# Patient Record
Sex: MALE | Race: WHITE | NOT HISPANIC OR LATINO | Employment: FULL TIME | URBAN - METROPOLITAN AREA
[De-identification: names, ages, dates, MRNs, and addresses within clinical notes are randomized per-mention and may not be internally consistent; named-entity substitution may affect disease eponyms.]

---

## 2017-01-11 ENCOUNTER — OFFICE VISIT (OUTPATIENT)
Dept: ENDOCRINOLOGY | Age: 47
End: 2017-01-11

## 2017-01-11 VITALS
OXYGEN SATURATION: 97 % | SYSTOLIC BLOOD PRESSURE: 130 MMHG | WEIGHT: 270 LBS | HEIGHT: 68 IN | DIASTOLIC BLOOD PRESSURE: 70 MMHG | HEART RATE: 91 BPM | BODY MASS INDEX: 40.92 KG/M2

## 2017-01-11 DIAGNOSIS — Z99.89 OBSTRUCTIVE SLEEP APNEA ON CPAP: Primary | ICD-10-CM

## 2017-01-11 DIAGNOSIS — E11.9 TYPE 2 DIABETES MELLITUS WITHOUT COMPLICATION, WITHOUT LONG-TERM CURRENT USE OF INSULIN (HCC): ICD-10-CM

## 2017-01-11 DIAGNOSIS — K76.0 NAFLD (NONALCOHOLIC FATTY LIVER DISEASE): ICD-10-CM

## 2017-01-11 DIAGNOSIS — E66.09 OBESITY DUE TO EXCESS CALORIES, UNSPECIFIED OBESITY SEVERITY: ICD-10-CM

## 2017-01-11 DIAGNOSIS — G47.33 OBSTRUCTIVE SLEEP APNEA ON CPAP: Primary | ICD-10-CM

## 2017-01-11 DIAGNOSIS — G47.30 SLEEP APNEA, UNSPECIFIED TYPE: ICD-10-CM

## 2017-01-11 DIAGNOSIS — E78.5 HYPERLIPIDEMIA, UNSPECIFIED HYPERLIPIDEMIA TYPE: ICD-10-CM

## 2017-01-11 PROCEDURE — 99214 OFFICE O/P EST MOD 30 MIN: CPT | Performed by: INTERNAL MEDICINE

## 2017-01-11 NOTE — LETTER
January 11, 2017     Hector Oseguera MD  [address]  [facility] [address]    Patient: Victor Hugo Monet   YOB: 1970   Date of Visit: 1/11/2017       Dear Dr. Ana Rosa MD:    Thank you for referring Victor Hugo Monet to me for evaluation. Below are the relevant portions of my assessment and plan of care.    If you have questions, please do not hesitate to call me. I look forward to following Victor Hugo along with you.         Sincerely,        Jameson Kuo MD        CC: No Recipients  Jameson Kuo MD  1/11/2017  4:28 PM  Signed  Subjective   Victor Hugo Monet is a 46 y.o. male.     HPI Comments: DM2. B/S check 2x a day. [facility] DM eye exam July 2016. Last DM foot exam today with Dr. Kuo.     Diabetes   Hypoglycemia symptoms include headaches.      Patient is a 46-year-old male who came in for follow-up.  He has been on Farxiga 10 mg once a day.  Hemoglobin A1c done in July 2016 was 8.24%.  Fasting blood sugar runs between 150-182.  He had a recent urinalysis which showed glucosuria which is expected on the patient using Farxiga.  He has used metformin the past which cause abdominal cramping and diarrhea.    He has no weight change since July 2016.  He had an eye examination in July 2016 and was prescribed new glasses.  He denies numbness, tingling or burning sensation in his hands or feet.     He has hyperlipidemia and has been on Lipitor 10 mg once a day.  He denies any myalgia.      He had 2 previous episodes of pancreatitis, the first one was in 2014 and the last one was in 2016.  He had a normal MRCP and gallbladder ultrasound.  CT scan of the abdomen showed hepatic steatosis.    He has tried multiple diets in the past but has been unsuccessful.  He is considering bariatric surgery.      He has sleep apnea and is using his CPAP regularly.  He wakes up rested.    The following portions of the patient's history were reviewed and updated as appropriate: allergies, current medications, past family  "history, past medical history, past social history, past surgical history and problem list.    Review of Systems   Constitutional: Negative.    Eyes: Negative.    Respiratory: Negative.    Cardiovascular: Negative.    Gastrointestinal: Negative.    Endocrine: Negative.    Genitourinary: Negative.    Musculoskeletal: Negative.    Skin: Negative.    Allergic/Immunologic: Negative.    Neurological: Positive for headaches. Light-headedness:  migraines last two months.   Hematological: Negative.    Psychiatric/Behavioral: Positive for sleep disturbance (sleep apnea uses cpap).       Objective      Vitals:    01/11/17 1504   BP: 130/70   BP Location: Right arm   Patient Position: Sitting   Cuff Size: Large Adult   Pulse: 91   SpO2: 97%   Weight: 270 lb (122 kg)   Height: 68\" (172.7 cm)     Physical Exam   Constitutional: He is oriented to person, place, and time. He appears well-developed and well-nourished. No distress.   HENT:   Head: Normocephalic.   Nose: Nose normal.   Mouth/Throat: No oropharyngeal exudate.   Eyes: Conjunctivae and EOM are normal. Right eye exhibits no discharge. Left eye exhibits no discharge. No scleral icterus.   Neck: Neck supple. No JVD present. No tracheal deviation present. No thyromegaly present.   Cardiovascular: Normal rate, regular rhythm, normal heart sounds and intact distal pulses.  Exam reveals no friction rub.    No murmur heard.  Pulmonary/Chest: Effort normal and breath sounds normal. No respiratory distress. He has no wheezes. He has no rales.   Abdominal: Soft. Bowel sounds are normal. He exhibits no distension and no mass. There is no tenderness.   Musculoskeletal: Normal range of motion. He exhibits no edema, tenderness or deformity.   Lymphadenopathy:     He has no cervical adenopathy.   Neurological: He is alert and oriented to person, place, and time. He displays normal reflexes. No cranial nerve deficit. Coordination normal.   Intact light touch   Skin: Skin is warm and " dry. No rash noted. No erythema. No pallor.   Psychiatric: He has a normal mood and affect. His behavior is normal.     Office Visit on 07/07/2016   Component Date Value Ref Range Status   • Glucose 07/07/2016 137* 65 - 99 mg/dL Final   • BUN 07/07/2016 17  6 - 20 mg/dL Final   • Creatinine 07/07/2016 1.08  0.76 - 1.27 mg/dL Final   • eGFR Non  Am 07/07/2016 73  >60 mL/min/1.73 Final   • eGFR African Am 07/07/2016 89  >60 mL/min/1.73 Final   • BUN/Creatinine Ratio 07/07/2016 15.7  7.0 - 25.0 Final   • Sodium 07/07/2016 141  136 - 145 mmol/L Final   • Potassium 07/07/2016 4.7  3.5 - 5.2 mmol/L Final   • Chloride 07/07/2016 98  98 - 107 mmol/L Final   • Total CO2 07/07/2016 27.3  22.0 - 29.0 mmol/L Final   • Calcium 07/07/2016 10.6* 8.6 - 10.5 mg/dL Final   • Total Protein 07/07/2016 7.9  6.0 - 8.5 g/dL Final   • Albumin 07/07/2016 5.10  3.50 - 5.20 g/dL Final   • Globulin 07/07/2016 2.8  gm/dL Final   • A/G Ratio 07/07/2016 1.8  g/dL Final   • Total Bilirubin 07/07/2016 0.5  0.1 - 1.2 mg/dL Final   • Alkaline Phosphatase 07/07/2016 63  39 - 117 U/L Final   • AST (SGOT) 07/07/2016 39  1 - 40 U/L Final   • ALT (SGPT) 07/07/2016 60* 1 - 41 U/L Final   • Total Cholesterol 07/07/2016 190  0 - 200 mg/dL Final   • Triglycerides 07/07/2016 170* 0 - 150 mg/dL Final   • HDL Cholesterol 07/07/2016 52  40 - 60 mg/dL Final   • VLDL Cholesterol 07/07/2016 34  5 - 40 mg/dL Final   • LDL Cholesterol  07/07/2016 104* 0 - 100 mg/dL Final   • Hemoglobin A1C 07/07/2016 8.24* 4.80 - 5.60 % Final    Comment: Hemoglobin A1C Ranges:  Increased Risk for Diabetes  5.7% to 6.4%  Diabetes                     >= 6.5%  Diabetic Goal                < 7.0%     • TSH 07/07/2016 1.310  0.270 - 4.200 mIU/mL Final   • Free T4 07/07/2016 1.22  0.93 - 1.70 ng/dL Final   • Creatinine, Urine 07/07/2016 68.7  Not Estab. mg/dL Final   • Microalbumin, Urine 07/07/2016 32.0  Not Estab. ug/mL Final   • Microalbumin/Creatinine Ratio Urine 07/07/2016 46.6*  0.0 - 30.0 mg/g creat Final     Assessment/Plan   Victor Hugo was seen today for diabetes.    Diagnoses and all orders for this visit:    Obstructive sleep apnea on CPAP    Obesity due to excess calories, unspecified obesity severity    NAFLD (nonalcoholic fatty liver disease)    Type 2 diabetes mellitus without complication, without long-term current use of insulin  -     Comprehensive Metabolic Panel  -     Hemoglobin A1c  -     Lipid Panel  -     Microalbumin / Creatinine Urine Ratio  -     Ambulatory Referral to Diabetic Education  -     linagliptin (TRADJENTA) 5 MG tablet tablet; Take 1 tablet by mouth Daily.    Sleep apnea, unspecified type    Hyperlipidemia, unspecified hyperlipidemia type  -     Lipid Panel      Discontinue Farxiga which can cause glucosuria and is not FAA approved    Tradjenta 5 mg once a day.  Continue Lipitor 10 mg once a day.  Continue CPAP.  Discussed about bariatric surgery.  Patient info on bariatric surgery given to patient.    Send copy of my note and labs to Dr. Oseguera and Dr. Vic Garcia at [facility] [employer], [facility], [address], [place] [address]    RTC 3- 4 mos

## 2017-01-11 NOTE — PROGRESS NOTES
Subjective   Victor Hugo Monet is a 46 y.o. male.     HPI Comments: DM2. B/S check 2x a day. [facility] DM eye exam July 2016. Last DM foot exam today with Dr. Kuo.     Diabetes   Hypoglycemia symptoms include headaches.      Patient is a 46-year-old male who came in for follow-up.  He has been on Farxiga 10 mg once a day.  Hemoglobin A1c done in July 2016 was 8.24%.  Fasting blood sugar runs between 150-182.  He had a recent urinalysis which showed glucosuria which is expected on the patient using Farxiga.  He has used metformin the past which cause abdominal cramping and diarrhea.    He has no weight change since July 2016.  He had an eye examination in July 2016 and was prescribed new glasses.  He denies numbness, tingling or burning sensation in his hands or feet.     He has hyperlipidemia and has been on Lipitor 10 mg once a day.  He denies any myalgia.      He had 2 previous episodes of pancreatitis, the first one was in 2014 and the last one was in 2016.  He had a normal MRCP and gallbladder ultrasound.  CT scan of the abdomen showed hepatic steatosis.    He has tried multiple diets in the past but has been unsuccessful.  He is considering bariatric surgery.      He has sleep apnea and is using his CPAP regularly.  He wakes up rested.    The following portions of the patient's history were reviewed and updated as appropriate: allergies, current medications, past family history, past medical history, past social history, past surgical history and problem list.    Review of Systems   Constitutional: Negative.    Eyes: Negative.    Respiratory: Negative.    Cardiovascular: Negative.    Gastrointestinal: Negative.    Endocrine: Negative.    Genitourinary: Negative.    Musculoskeletal: Negative.    Skin: Negative.    Allergic/Immunologic: Negative.    Neurological: Positive for headaches. Light-headedness:  migraines last two months.   Hematological: Negative.    Psychiatric/Behavioral: Positive for sleep disturbance  "(sleep apnea uses cpap).       Objective      Vitals:    01/11/17 1504   BP: 130/70   BP Location: Right arm   Patient Position: Sitting   Cuff Size: Large Adult   Pulse: 91   SpO2: 97%   Weight: 270 lb (122 kg)   Height: 68\" (172.7 cm)     Physical Exam   Constitutional: He is oriented to person, place, and time. He appears well-developed and well-nourished. No distress.   HENT:   Head: Normocephalic.   Nose: Nose normal.   Mouth/Throat: No oropharyngeal exudate.   Eyes: Conjunctivae and EOM are normal. Right eye exhibits no discharge. Left eye exhibits no discharge. No scleral icterus.   Neck: Neck supple. No JVD present. No tracheal deviation present. No thyromegaly present.   Cardiovascular: Normal rate, regular rhythm, normal heart sounds and intact distal pulses.  Exam reveals no friction rub.    No murmur heard.  Pulmonary/Chest: Effort normal and breath sounds normal. No respiratory distress. He has no wheezes. He has no rales.   Abdominal: Soft. Bowel sounds are normal. He exhibits no distension and no mass. There is no tenderness.   Musculoskeletal: Normal range of motion. He exhibits no edema, tenderness or deformity.   Lymphadenopathy:     He has no cervical adenopathy.   Neurological: He is alert and oriented to person, place, and time. He displays normal reflexes. No cranial nerve deficit. Coordination normal.   Intact light touch   Skin: Skin is warm and dry. No rash noted. No erythema. No pallor.   Psychiatric: He has a normal mood and affect. His behavior is normal.     Office Visit on 07/07/2016   Component Date Value Ref Range Status   • Glucose 07/07/2016 137* 65 - 99 mg/dL Final   • BUN 07/07/2016 17  6 - 20 mg/dL Final   • Creatinine 07/07/2016 1.08  0.76 - 1.27 mg/dL Final   • eGFR Non  Am 07/07/2016 73  >60 mL/min/1.73 Final   • eGFR African Am 07/07/2016 89  >60 mL/min/1.73 Final   • BUN/Creatinine Ratio 07/07/2016 15.7  7.0 - 25.0 Final   • Sodium 07/07/2016 141  136 - 145 mmol/L " Final   • Potassium 07/07/2016 4.7  3.5 - 5.2 mmol/L Final   • Chloride 07/07/2016 98  98 - 107 mmol/L Final   • Total CO2 07/07/2016 27.3  22.0 - 29.0 mmol/L Final   • Calcium 07/07/2016 10.6* 8.6 - 10.5 mg/dL Final   • Total Protein 07/07/2016 7.9  6.0 - 8.5 g/dL Final   • Albumin 07/07/2016 5.10  3.50 - 5.20 g/dL Final   • Globulin 07/07/2016 2.8  gm/dL Final   • A/G Ratio 07/07/2016 1.8  g/dL Final   • Total Bilirubin 07/07/2016 0.5  0.1 - 1.2 mg/dL Final   • Alkaline Phosphatase 07/07/2016 63  39 - 117 U/L Final   • AST (SGOT) 07/07/2016 39  1 - 40 U/L Final   • ALT (SGPT) 07/07/2016 60* 1 - 41 U/L Final   • Total Cholesterol 07/07/2016 190  0 - 200 mg/dL Final   • Triglycerides 07/07/2016 170* 0 - 150 mg/dL Final   • HDL Cholesterol 07/07/2016 52  40 - 60 mg/dL Final   • VLDL Cholesterol 07/07/2016 34  5 - 40 mg/dL Final   • LDL Cholesterol  07/07/2016 104* 0 - 100 mg/dL Final   • Hemoglobin A1C 07/07/2016 8.24* 4.80 - 5.60 % Final    Comment: Hemoglobin A1C Ranges:  Increased Risk for Diabetes  5.7% to 6.4%  Diabetes                     >= 6.5%  Diabetic Goal                < 7.0%     • TSH 07/07/2016 1.310  0.270 - 4.200 mIU/mL Final   • Free T4 07/07/2016 1.22  0.93 - 1.70 ng/dL Final   • Creatinine, Urine 07/07/2016 68.7  Not Estab. mg/dL Final   • Microalbumin, Urine 07/07/2016 32.0  Not Estab. ug/mL Final   • Microalbumin/Creatinine Ratio Urine 07/07/2016 46.6* 0.0 - 30.0 mg/g creat Final     Assessment/Plan   Victor Hugo was seen today for diabetes.    Diagnoses and all orders for this visit:    Obstructive sleep apnea on CPAP    Obesity due to excess calories, unspecified obesity severity    NAFLD (nonalcoholic fatty liver disease)    Type 2 diabetes mellitus without complication, without long-term current use of insulin  -     Comprehensive Metabolic Panel  -     Hemoglobin A1c  -     Lipid Panel  -     Microalbumin / Creatinine Urine Ratio  -     Ambulatory Referral to Diabetic Education  -      linagliptin (TRADJENTA) 5 MG tablet tablet; Take 1 tablet by mouth Daily.    Sleep apnea, unspecified type    Hyperlipidemia, unspecified hyperlipidemia type  -     Lipid Panel      Discontinue Farxiga which can cause glucosuria and is not FAA approved    Tradjenta 5 mg once a day.  Continue Lipitor 10 mg once a day.  Continue CPAP.  Discussed about bariatric surgery.  Patient info on bariatric surgery given to patient.    Send copy of my note and labs to Dr. Oseguera and Dr. Vic Garcia at [facility] [employer], [facility], [address], [place] [address]    RTC 3- 4 mos

## 2017-01-11 NOTE — MR AVS SNAPSHOT
Victor Hugo Monet   1/11/2017 3:00 PM   Office Visit    Dept Phone:  [phone]   Encounter #:  [id number]    Provider:  Jameson Kuo MD   Department:  [facility] ENDOCRINOLOGY                Your Full Care Plan              Today's Medication Changes          These changes are accurate as of: 1/11/17  4:36 PM.  If you have any questions, ask your nurse or doctor.               New Medication(s)Ordered:     linagliptin 5 MG tablet tablet   Commonly known as:  TRADJENTA   Take 1 tablet by mouth Daily.   Started by:  Jameson Kuo MD         Stop taking medication(s)listed here:     Dapagliflozin Propanediol 10 MG tablet   Stopped by:  Jameson Kuo MD                Where to Get Your Medications      These medications were sent to [facility] [address] [place] [address] AT [address] - [phone]  - [phone] [address] [address]     Phone:  [phone]     linagliptin 5 MG tablet tablet                  Your Updated Medication List          This list is accurate as of: 1/11/17  4:36 PM.  Always use your most recent med list.                ALIGN PO       atorvastatin 10 MG tablet   Commonly known as:  LIPITOR   Take 1 tablet by mouth Daily.       esomeprazole 40 MG capsule   Commonly known as:  nexIUM   Take 1 capsule by mouth 2 (Two) Times a Day.       glucose blood test strip   Freestyle light glucose strips, test BID       linagliptin 5 MG tablet tablet   Commonly known as:  TRADJENTA   Take 1 tablet by mouth Daily.               We Performed the Following     Ambulatory Referral to Diabetic Education     Comprehensive Metabolic Panel     Hemoglobin A1c     Lipid Panel     Microalbumin / Creatinine Urine Ratio     Urinalysis With / Microscopic If Indicated       You Were Diagnosed With        Codes Comments    Obstructive sleep apnea on CPAP    -  Primary ICD-10-CM: G47.33  ICD-9-CM:  327.23     Obesity due to excess calories, unspecified obesity severity     ICD-10-CM: E66.09  ICD-9-CM: 278.00     NAFLD (nonalcoholic fatty liver disease)     ICD-10-CM: K76.0  ICD-9-CM: 571.8     Type 2 diabetes mellitus without complication, without long-term current use of insulin     ICD-10-CM: E11.9  ICD-9-CM: 250.00     Sleep apnea, unspecified type     ICD-10-CM: G47.30  ICD-9-CM: 780.57     Hyperlipidemia, unspecified hyperlipidemia type     ICD-10-CM: E78.5  ICD-9-CM: 272.4       Instructions     None    Patient Instructions History      Upcoming Appointments     Visit Type Date Time Department    OFFICE VISIT 2017  3:00 PM [facility]    OFFICE VISIT 3/30/2017 11:30 AM [facility]    OFFICE VISIT 2017  1:20 PM [facility]      [employer] Signup     [facility] [employer] allows you to send messages to your doctor, view your test results, renew your prescriptions, schedule appointments, and more. To sign up, go to [email or web address] and click on the Sign Up Now link in the New User? box. Enter your [employer] Activation Code exactly as it appears below along with the last four digits of your Social Security Number and your Date of Birth () to complete the sign-up process. If you do not sign up before the expiration date, you must request a new code.    [employer] Activation Code: [email or web address]  Expires: 2017  5:40 AM    If you have questions, you can email [email or web address] or call [phone] to talk to our [employer] staff. Remember, [employer] is NOT to be used for urgent needs. For medical emergencies, dial 911.               Other Info from Your Visit           Your Appointments     Mar 30, 2017 11:30 AM EDT   Office Visit with Jameson Kuo MD   [facility] (--)    [address] [facility] [place] [address] [phone]           Arrive 15 minutes prior to appointment.            2017  1:20 PM EDT  "  Office Visit with Hector Oseguera MD   [facility] (--)    [address] [facility] [address] [phone]           Arrive 15 minutes prior to appointment.              Allergies     No Known Allergies      Reason for Visit     Diabetes           Vital Signs     Blood Pressure Pulse Height Weight Oxygen Saturation Body Mass Index    130/70 (BP Location: Right arm, Patient Position: Sitting, Cuff Size: Large Adult) 91 68\" (172.7 cm) 270 lb (122 kg) 97% 41.05 kg/m2    Smoking Status                   Former Smoker           Problems and Diagnoses Noted     High cholesterol or triglycerides    NAFLD (nonalcoholic fatty liver disease)    Obesity due to excess calories    Sleep apnea    Sleep apnea    Type 2 diabetes mellitus without complication        "

## 2017-01-12 LAB
ALBUMIN SERPL-MCNC: 5.2 G/DL (ref 3.5–5.2)
ALBUMIN/CREAT UR: 38 MG/G CREAT (ref 0–30)
ALBUMIN/GLOB SERPL: 1.6 G/DL
ALP SERPL-CCNC: 60 U/L (ref 39–117)
ALT SERPL-CCNC: 52 U/L (ref 1–41)
APPEARANCE UR: CLEAR
AST SERPL-CCNC: 37 U/L (ref 1–40)
BILIRUB SERPL-MCNC: 0.5 MG/DL (ref 0.1–1.2)
BILIRUB UR QL STRIP: ABNORMAL
BUN SERPL-MCNC: 20 MG/DL (ref 6–20)
BUN/CREAT SERPL: 18.2 (ref 7–25)
CALCIUM SERPL-MCNC: 10.5 MG/DL (ref 8.6–10.5)
CHLORIDE SERPL-SCNC: 97 MMOL/L (ref 98–107)
CHOLEST SERPL-MCNC: 156 MG/DL (ref 0–200)
CO2 SERPL-SCNC: 24.9 MMOL/L (ref 22–29)
COLOR UR: YELLOW
CREAT SERPL-MCNC: 1.1 MG/DL (ref 0.76–1.27)
CREAT UR-MCNC: 89.2 MG/DL
GLOBULIN SER CALC-MCNC: 3.2 GM/DL
GLUCOSE SERPL-MCNC: 174 MG/DL (ref 65–99)
GLUCOSE UR QL: ABNORMAL
HBA1C MFR BLD: 8.4 % (ref 4.8–5.6)
HDLC SERPL-MCNC: 51 MG/DL (ref 40–60)
HGB UR QL STRIP: ABNORMAL
KETONES UR QL STRIP: ABNORMAL
LDLC SERPL CALC-MCNC: 72 MG/DL (ref 0–100)
LEUKOCYTE ESTERASE UR QL STRIP: ABNORMAL
MICROALBUMIN UR-MCNC: 33.9 UG/ML
NITRITE UR QL STRIP: ABNORMAL
PH UR STRIP: 6 [PH] (ref 5–8)
POTASSIUM SERPL-SCNC: 4.6 MMOL/L (ref 3.5–5.2)
PROT SERPL-MCNC: 8.4 G/DL (ref 6–8.5)
PROT UR QL STRIP: ABNORMAL
SODIUM SERPL-SCNC: 141 MMOL/L (ref 136–145)
SP GR UR: ABNORMAL (ref 1–1.03)
TRIGL SERPL-MCNC: 163 MG/DL (ref 0–150)
UROBILINOGEN UR STRIP-MCNC: ABNORMAL MG/DL
VLDLC SERPL CALC-MCNC: 32.6 MG/DL (ref 5–40)

## 2017-01-13 ENCOUNTER — TELEPHONE (OUTPATIENT)
Dept: ENDOCRINOLOGY | Age: 47
End: 2017-01-13

## 2017-01-13 RX ORDER — GLIMEPIRIDE 2 MG/1
2 TABLET ORAL EVERY MORNING
Qty: 30 TABLET | Refills: 5 | Status: SHIPPED | OUTPATIENT
Start: 2017-01-13 | End: 2017-06-02

## 2017-01-13 NOTE — TELEPHONE ENCOUNTER
----- Message from Georgia Live sent at 1/13/2017  2:08 PM EST -----  Contact: patient  Patient is returning your call and would like another call back regarding lad results

## 2017-01-18 ENCOUNTER — OFFICE VISIT (OUTPATIENT)
Dept: FAMILY MEDICINE CLINIC | Facility: CLINIC | Age: 47
End: 2017-01-18

## 2017-01-18 VITALS
HEART RATE: 70 BPM | TEMPERATURE: 97.7 F | WEIGHT: 270 LBS | BODY MASS INDEX: 40.92 KG/M2 | OXYGEN SATURATION: 98 % | DIASTOLIC BLOOD PRESSURE: 82 MMHG | HEIGHT: 68 IN | SYSTOLIC BLOOD PRESSURE: 132 MMHG

## 2017-01-18 DIAGNOSIS — G47.30 SLEEP APNEA, UNSPECIFIED TYPE: ICD-10-CM

## 2017-01-18 DIAGNOSIS — E66.09 OBESITY DUE TO EXCESS CALORIES, UNSPECIFIED OBESITY SEVERITY: ICD-10-CM

## 2017-01-18 DIAGNOSIS — E11.9 TYPE 2 DIABETES MELLITUS WITHOUT COMPLICATION, WITHOUT LONG-TERM CURRENT USE OF INSULIN (HCC): Primary | ICD-10-CM

## 2017-01-18 PROCEDURE — 99213 OFFICE O/P EST LOW 20 MIN: CPT | Performed by: INTERNAL MEDICINE

## 2017-01-18 NOTE — PROGRESS NOTES
Subjective  plain is follow-up for diabetes and paperwork for disability  Victor Hugo Monet is a 46 y.o. male.     History of Present Illness   Victor Hugo is here today for follow-up on his non-insulin-dependent diabetes mellitus.  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  He had an increased amount of sugar in his urine.  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  He reported that he was prediabetic.  His sugar and his urine was likely elevated because of Farxiga apparently is not an FAA approved medication.  He has since seen his endocrinologist.  He has been started on glimepiride.  He previously has had trouble with pancreatitis while on the DP P4 inhibitors.  He has not tolerated metformin well.  He does have obesity by body mass index and does have obstructive sleep apnea.  He is contemplating weight loss surgery to help combat his diabetes.  The following portions of the patient's history were reviewed and updated as appropriate: allergies, current medications, past medical history and problem list.    Review of Systems   Gastrointestinal: Negative for abdominal pain, diarrhea, nausea and vomiting.       Objective   Physical Exam   Cardiovascular: Normal rate, regular rhythm and normal heart sounds.    Pulmonary/Chest: Effort normal and breath sounds normal.   Abdominal: Soft. Bowel sounds are normal. He exhibits no distension. There is no tenderness. There is no rebound and no guarding.   Musculoskeletal: He exhibits no edema.   Nursing note and vitals reviewed.      Assessment/Plan   Victor Hugo was seen today for follow-up, diabetes and hyperlipidemia.    Diagnoses and all orders for this visit:    Type 2 diabetes mellitus without complication, without long-term current use of insulin    Obesity due to excess calories, unspecified obesity severity    Sleep apnea, unspecified type    Other orders  -     glucose blood test strip; Freestyle light glucose strips, test BID    Victor Hugo  here today for follow-up.  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  He is going to look into bariatric surgery and I will write a letter

## 2017-01-23 ENCOUNTER — TELEPHONE (OUTPATIENT)
Dept: FAMILY MEDICINE CLINIC | Facility: CLINIC | Age: 47
End: 2017-01-23

## 2017-01-23 NOTE — TELEPHONE ENCOUNTER
----- Message from Theresa Escobar sent at 1/23/2017  2:24 PM EST -----  OSVALDO NEEDS A TREATMENT PLAN AND WORK STATUS FOR HIS [facility] PAPERWORK  Eyes the patient that under treatment plan I filled out that he was already on glimepiride and Ronni's may need to be added.  But that is about as good as I can do because Dr. Kuo

## 2017-01-23 NOTE — TELEPHONE ENCOUNTER
msg left infoming pt fmla forms are ready to be picked up.    Forms were also faxed to # listed on forms

## 2017-01-26 LAB — HM DIABETES EYE EXAM: NORMAL

## 2017-02-07 ENCOUNTER — APPOINTMENT (OUTPATIENT)
Dept: DIABETES SERVICES | Facility: HOSPITAL | Age: 47
End: 2017-02-07
Attending: INTERNAL MEDICINE

## 2017-02-07 PROCEDURE — G0109 DIAB MANAGE TRN IND/GROUP: HCPCS

## 2017-02-14 ENCOUNTER — APPOINTMENT (OUTPATIENT)
Dept: DIABETES SERVICES | Facility: HOSPITAL | Age: 47
End: 2017-02-14
Attending: INTERNAL MEDICINE

## 2017-02-14 PROCEDURE — G0109 DIAB MANAGE TRN IND/GROUP: HCPCS

## 2017-02-16 ENCOUNTER — OFFICE (OUTPATIENT)
Dept: URBAN - METROPOLITAN AREA OTHER 6 | Facility: OTHER | Age: 47
End: 2017-02-16

## 2017-02-16 VITALS
HEART RATE: 80 BPM | HEIGHT: 68 IN | WEIGHT: 260 LBS | DIASTOLIC BLOOD PRESSURE: 79 MMHG | SYSTOLIC BLOOD PRESSURE: 129 MMHG

## 2017-02-16 DIAGNOSIS — K21.0 GASTRO-ESOPHAGEAL REFLUX DISEASE WITH ESOPHAGITIS: ICD-10-CM

## 2017-02-16 DIAGNOSIS — Z12.11 ENCOUNTER FOR SCREENING FOR MALIGNANT NEOPLASM OF COLON: ICD-10-CM

## 2017-02-16 PROCEDURE — 99213 OFFICE O/P EST LOW 20 MIN: CPT

## 2017-02-17 RX ORDER — BLOOD-GLUCOSE METER
KIT MISCELLANEOUS
Qty: 100 EACH | Refills: 1 | Status: SHIPPED | OUTPATIENT
Start: 2017-02-17 | End: 2017-06-02 | Stop reason: SDUPTHER

## 2017-02-21 ENCOUNTER — APPOINTMENT (OUTPATIENT)
Dept: DIABETES SERVICES | Facility: HOSPITAL | Age: 47
End: 2017-02-21
Attending: INTERNAL MEDICINE

## 2017-02-21 PROCEDURE — G0109 DIAB MANAGE TRN IND/GROUP: HCPCS

## 2017-02-28 ENCOUNTER — APPOINTMENT (OUTPATIENT)
Dept: DIABETES SERVICES | Facility: HOSPITAL | Age: 47
End: 2017-02-28
Attending: INTERNAL MEDICINE

## 2017-02-28 PROCEDURE — G0109 DIAB MANAGE TRN IND/GROUP: HCPCS

## 2017-06-02 ENCOUNTER — OFFICE VISIT (OUTPATIENT)
Dept: FAMILY MEDICINE CLINIC | Facility: CLINIC | Age: 47
End: 2017-06-02

## 2017-06-02 VITALS
HEART RATE: 59 BPM | TEMPERATURE: 97.9 F | HEIGHT: 68 IN | WEIGHT: 259 LBS | BODY MASS INDEX: 39.25 KG/M2 | SYSTOLIC BLOOD PRESSURE: 120 MMHG | RESPIRATION RATE: 16 BRPM | DIASTOLIC BLOOD PRESSURE: 80 MMHG

## 2017-06-02 DIAGNOSIS — E78.5 HYPERLIPIDEMIA, UNSPECIFIED HYPERLIPIDEMIA TYPE: ICD-10-CM

## 2017-06-02 DIAGNOSIS — K11.5 STONE OF SALIVARY DUCT: ICD-10-CM

## 2017-06-02 DIAGNOSIS — E11.9 TYPE 2 DIABETES MELLITUS WITHOUT COMPLICATION, WITHOUT LONG-TERM CURRENT USE OF INSULIN (HCC): Primary | ICD-10-CM

## 2017-06-02 PROCEDURE — 99213 OFFICE O/P EST LOW 20 MIN: CPT | Performed by: INTERNAL MEDICINE

## 2017-06-02 RX ORDER — ESOMEPRAZOLE MAGNESIUM 40 MG/1
40 CAPSULE, DELAYED RELEASE ORAL DAILY
Qty: 90 CAPSULE | Refills: 1 | Status: SHIPPED | OUTPATIENT
Start: 2017-06-02 | End: 2017-12-08 | Stop reason: SDUPTHER

## 2017-06-02 RX ORDER — ATORVASTATIN CALCIUM 10 MG/1
10 TABLET, FILM COATED ORAL DAILY
Qty: 90 TABLET | Refills: 1 | Status: SHIPPED | OUTPATIENT
Start: 2017-06-02 | End: 2017-12-08 | Stop reason: SDUPTHER

## 2017-06-02 NOTE — PROGRESS NOTES
Subjective if complaint is follow-up for diabetes and cholesterol  Victor Hugo Monet is a 47 y.o. male.     History of Present Illness   Albert is here today for follow-up.  His A1c was tested by his endocrinologist in March.  It was 6.3.  His cholesterol was checked in January and he was within guidelines on all of his numbers.  He now is on metformin extended release and Januvia.  This seems to control his blood sugar without causing pancreatitis or abdominal symptoms.  He continues to take Nexium for reflux but is only taking it once a day.  Complains of a hard knot beneath his tongue.  He is unsure of how long it is been there.  It is not necessarily painful.  The following portions of the patient's history were reviewed and updated as appropriate: allergies, current medications, past medical history and problem list.    Review of Systems   Respiratory: Negative for chest tightness and shortness of breath.    Cardiovascular: Negative for chest pain and leg swelling.        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   Neurological: Negative for dizziness, light-headedness and headaches.       Objective   Physical Exam   Constitutional: He appears well-developed and well-nourished.   HENT:   There is a palpable Stone like density in the left submandibular duct.   Neck: Carotid bruit is not present. No thyromegaly present.   Cardiovascular: Normal rate, regular rhythm, normal heart sounds and intact distal pulses.  Exam reveals no gallop and no friction rub.    No murmur heard.  Pulmonary/Chest: Effort normal and breath sounds normal. No respiratory distress. He has no wheezes. He has no rales.   Abdominal: Soft. Bowel sounds are normal. He exhibits no distension. There is no tenderness. There is no rebound and no guarding.   Musculoskeletal: He exhibits no edema.   Nursing note and vitals reviewed.      Assessment/Plan   Victor Hugo was seen today for  follow-up.    Diagnoses and all orders for this visit:    Type 2 diabetes mellitus without complication, without long-term current use of insulin    Hyperlipidemia, unspecified hyperlipidemia type    Stone of salivary duct  -     Ambulatory Referral to ENT (Otolaryngology)    Other orders  -     esomeprazole (nexIUM) 40 MG capsule; Take 1 capsule by mouth Daily.  -     atorvastatin (LIPITOR) 10 MG tablet; Take 1 tablet by mouth Daily.  -     glucose blood (FREESTYLE LITE) test strip; 1 each by Other route Daily. Use as instructed      Victor Hugo is here today for follow-up.  He seems to be doing well in terms of his diabetes and has lost some weight.  I am going to see him back in 6 months.  He does have a salivary stone in the left submandibular duct.  I am going to refer him to an ear nose and throat doctor.

## 2017-08-21 ENCOUNTER — TELEPHONE (OUTPATIENT)
Dept: FAMILY MEDICINE CLINIC | Facility: CLINIC | Age: 47
End: 2017-08-21

## 2017-08-21 DIAGNOSIS — K64.9 HEMORRHOIDS, UNSPECIFIED HEMORRHOID TYPE: Primary | ICD-10-CM

## 2017-08-21 NOTE — TELEPHONE ENCOUNTER
----- Message from Rhonda Rosario sent at 8/21/2017  1:50 PM EDT -----  Patient would like to speak with you about getting a referral for hemorrhoids. He wasn't sure who he should contact. He can be reached at [phone].    Thanks,  Ina   A message was left that we would get him an appointment with Dr. Drummond.

## 2017-10-27 ENCOUNTER — OFFICE VISIT (OUTPATIENT)
Dept: SURGERY | Facility: CLINIC | Age: 47
End: 2017-10-27

## 2017-10-27 VITALS
SYSTOLIC BLOOD PRESSURE: 102 MMHG | HEART RATE: 57 BPM | BODY MASS INDEX: 39.56 KG/M2 | HEIGHT: 68 IN | WEIGHT: 261 LBS | OXYGEN SATURATION: 98 % | DIASTOLIC BLOOD PRESSURE: 74 MMHG | TEMPERATURE: 98 F

## 2017-10-27 DIAGNOSIS — K62.5 RECTAL BLEEDING: Primary | ICD-10-CM

## 2017-10-27 DIAGNOSIS — K64.8 INTERNAL HEMORRHOIDS WITH COMPLICATION: ICD-10-CM

## 2017-10-27 PROCEDURE — 46600 DIAGNOSTIC ANOSCOPY SPX: CPT | Performed by: COLON & RECTAL SURGERY

## 2017-10-27 PROCEDURE — 99244 OFF/OP CNSLTJ NEW/EST MOD 40: CPT | Performed by: COLON & RECTAL SURGERY

## 2017-10-27 NOTE — PROGRESS NOTES
Victor Hugo Monet is a 47 y.o. male who is seen as a consult at the request of Hector Oseguera,* for anal pain    HPI:    Pt c/o problems with painful hemorrhoids  Sits a lot for his job as a  for [employer]  Worse 2 months ago; better now    Has also noted some bleeding    Has tried PrepH cream, Tucks wipes, and PrepH suppositories    No fiber supplements or stool softeners    BMs regular, daily    Does sometimes have to strain    Diagnosed with BM last year; has changed his diet    On metformin, Januvia  Gave him diarrhea at first.  Now on XR, which helped    Hx pancreatitis 2014  GI Dr. Anand    Most recent cy 12/23/2009 Dr. Dubose for n/v, diarrhea, and abd pain.  Random bx neg    Past Medical History:   Diagnosis Date   • CPAP (continuous positive airway pressure) dependence    • GERD (gastroesophageal reflux disease)    • Hyperlipidemia    • Obesity    • Pancreatitis 2014,2016    Dr. Anand   • Sleep apnea     ON CPAP.  DR. PATEL   • Stone of salivary duct    • Type 2 diabetes mellitus        Past Surgical History:   Procedure Laterality Date   • ADENOIDECTOMY     • APPENDECTOMY     • COLONOSCOPY  2006    Normal   • ENDOSCOPY  2014    Gastritis   • ERCP     • HERNIA REPAIR      Umbilical and right inguinal       Social History:   reports that he quit smoking about 13 years ago. His smoking use included Cigarettes. He has a 10.00 pack-year smoking history. He has never used smokeless tobacco. He reports that he drinks alcohol. He reports that he does not use illicit drugs.      Marriage status:     Family History   Problem Relation Age of Onset   • Cancer Mother      RENAL KIDNEY CA    • Diabetes Mother    • Hypertension Mother    • Heart disease Mother    • Emphysema Mother    • Lung cancer Father    • Diabetes Father    • Hypertension Father          Current Outpatient Prescriptions:   •  atorvastatin (LIPITOR) 10 MG tablet, Take 1 tablet by mouth Daily., Disp: 90 tablet, Rfl: 1  •   esomeprazole (nexIUM) 40 MG capsule, Take 1 capsule by mouth Daily., Disp: 90 capsule, Rfl: 1  •  glucose blood (FREESTYLE LITE) test strip, 1 each by Other route Daily. Use as instructed, Disp: 100 each, Rfl: 1  •  metFORMIN (FORTAMET) 1000 MG (OSM) 24 hr tablet, Take 1,000 mg by mouth Daily With Breakfast., Disp: , Rfl:   •  Probiotic Product (ALIGN PO), Take 1 tablet by mouth., Disp: , Rfl:   •  SITagliptin (JANUVIA) 50 MG tablet, Take 100 mg by mouth Daily., Disp: , Rfl:     Allergy  Review of patient's allergies indicates no known allergies.    Review of Systems   Constitution: Negative for decreased appetite, weakness and weight gain.   HENT: Negative for congestion, hearing loss and hoarse voice.    Eyes: Negative for blurred vision, discharge and visual disturbance.   Cardiovascular: Negative for chest pain, cyanosis and leg swelling.   Respiratory: Negative for cough, shortness of breath, sleep disturbances due to breathing and snoring.    Endocrine: Negative for cold intolerance and heat intolerance.   Hematologic/Lymphatic: Does not bruise/bleed easily.   Skin: Negative for itching, poor wound healing and skin cancer.   Musculoskeletal: Negative for arthritis, back pain, joint pain and joint swelling.   Gastrointestinal: Negative for abdominal pain, change in bowel habit, bowel incontinence and constipation.   Genitourinary: Negative for bladder incontinence, dysuria and hematuria.   Neurological: Negative for brief paralysis, excessive daytime sleepiness, dizziness, focal weakness and light-headedness.   Psychiatric/Behavioral: Negative for altered mental status and hallucinations. The patient does not have insomnia.    Allergic/Immunologic: Negative for HIV exposure and persistent infections.   All other systems reviewed and are negative.      Vitals:    10/27/17 1031   BP: 102/74   Pulse: 57   Temp: 98 °F (36.7 °C)   SpO2: 98%     Body mass index is 39.68 kg/(m^2).    Physical Exam   Constitutional: He  is oriented to person, place, and time. He appears well-developed and well-nourished. No distress.   HENT:   Head: Normocephalic and atraumatic.   Nose: Nose normal.   Mouth/Throat: Oropharynx is clear and moist.   Eyes: Conjunctivae and EOM are normal. Pupils are equal, round, and reactive to light.   Neck: Normal range of motion. No tracheal deviation present.   Pulmonary/Chest: Effort normal and breath sounds normal. No respiratory distress.   Abdominal: Soft. Bowel sounds are normal. He exhibits no distension.   Genitourinary:   Genitourinary Comments: Perianal exam: external hem - wnl  TUCKER- good tone, no masses  Anoscopy performed:  Grade 2 x 3 internal hem   Musculoskeletal: Normal range of motion. He exhibits no edema or deformity.   Neurological: He is alert and oriented to person, place, and time. No cranial nerve deficit. Coordination and gait normal.   Skin: Skin is warm and dry.   Psychiatric: He has a normal mood and affect. His behavior is normal. Judgment normal.       Review of Medical Record:  I reviewed Dr. Oseguera records: pt called and requested referral for hemorrhoids  Also reviewed 2009 cy Dr. Dubose: random bx neg    Assessment:  1. Rectal bleeding    2. Internal hemorrhoids with complication        Plan:    For the rectal bleeding, I recommend colonoscopy to rule out major sources of bleeding other than hemorrhoids. At the time of colonoscopy if there are no serious issues found at that time, I recommend doing rubber band ligation of the enlarged internal hemorrhoids.  I described risk, benefits and alternatives to the patient.  I described to patient typical post procedure recovery, typical outcomes, and 85% success rate. The patient wishes to proceed.    I recommend fiber therapy and detailed and gave written instructions on how to achieve a high fiber diet.    Also gave samples Vasculera with instructions      Scribed for Aye Drummond MD by Shabnam Duong PA-C 10/27/2017  This patient  was evaluated by me, recommendations made, documentation reviewed, edited, and revised by me, Aye Drummond MD

## 2017-12-07 ENCOUNTER — TELEPHONE (OUTPATIENT)
Dept: FAMILY MEDICINE CLINIC | Facility: CLINIC | Age: 47
End: 2017-12-07

## 2017-12-07 NOTE — TELEPHONE ENCOUNTER
Patient called and didn't know if he should of cancel or reschedule his appointment for tomorrow Friday 12/08/2017 @ 11 am, [place] in [place] airport with a 6 hour weather delay if they do come into [place] it will be at 4 am and can make it in.  I told him lets keep the appointment for now and that we did open at 7:20 am, so we kept the appointment on.     He just wanted you to know his situation.    Carmen is aware and will call him at 8 am.    Thank you.

## 2017-12-08 ENCOUNTER — OFFICE VISIT (OUTPATIENT)
Dept: FAMILY MEDICINE CLINIC | Facility: CLINIC | Age: 47
End: 2017-12-08

## 2017-12-08 VITALS
DIASTOLIC BLOOD PRESSURE: 72 MMHG | RESPIRATION RATE: 16 BRPM | HEIGHT: 68 IN | HEART RATE: 68 BPM | SYSTOLIC BLOOD PRESSURE: 112 MMHG | BODY MASS INDEX: 39.86 KG/M2 | WEIGHT: 263 LBS | TEMPERATURE: 98.5 F

## 2017-12-08 DIAGNOSIS — E11.9 TYPE 2 DIABETES MELLITUS WITHOUT COMPLICATION, WITHOUT LONG-TERM CURRENT USE OF INSULIN (HCC): ICD-10-CM

## 2017-12-08 DIAGNOSIS — E78.5 HYPERLIPIDEMIA, UNSPECIFIED HYPERLIPIDEMIA TYPE: Primary | ICD-10-CM

## 2017-12-08 PROCEDURE — 99213 OFFICE O/P EST LOW 20 MIN: CPT | Performed by: INTERNAL MEDICINE

## 2017-12-08 RX ORDER — ATORVASTATIN CALCIUM 10 MG/1
10 TABLET, FILM COATED ORAL DAILY
Qty: 90 TABLET | Refills: 1 | Status: SHIPPED | OUTPATIENT
Start: 2017-12-08 | End: 2017-12-26 | Stop reason: SDUPTHER

## 2017-12-08 RX ORDER — ESOMEPRAZOLE MAGNESIUM 40 MG/1
40 CAPSULE, DELAYED RELEASE ORAL DAILY
Qty: 90 CAPSULE | Refills: 1 | Status: SHIPPED | OUTPATIENT
Start: 2017-12-08 | End: 2018-09-07 | Stop reason: SDUPTHER

## 2017-12-08 NOTE — PROGRESS NOTES
Subjective chief complaint is follow-up for diabetes and cholesterol  Victor Hugo Monet is a 47 y.o. male.     History of Present Illness   Victor Hugo is here today for follow-up.  He does have non-insulin-dependent diabetes mellitus.  He is primarily followed by an endocrinologist.  I was able to review the laboratories through the Epic system.  He had a hemoglobin A1c that was down to 6 in August.  He is due to see his endocrinologist next month.  I did look to see if a cholesterol panel was drawn at that time and could not find one.  His last cholesterol panel here was okay.  He does not have any high blood pressure.  He has not had any recent bouts of pancreatitis.  The following portions of the patient's history were reviewed and updated as appropriate: allergies, current medications, past medical history and problem list.    Review of Systems   Respiratory: Negative for chest tightness and shortness of breath.    Cardiovascular: Negative for chest pain and leg swelling.   Gastrointestinal:        He did see the colorectal specialist.  He is going to have a colonoscopy coming up soon and they will likely do some ligation of hemorrhoids.   Neurological: Negative for dizziness, light-headedness and headaches.       Objective   Physical Exam   Neck: Carotid bruit is not present. No thyromegaly present.   Cardiovascular: Normal rate and regular rhythm.    Pulmonary/Chest: Effort normal and breath sounds normal.   Abdominal: Soft. Bowel sounds are normal. He exhibits no distension. There is no tenderness.   Musculoskeletal: He exhibits no edema.   Nursing note and vitals reviewed.      Assessment/Plan   Victor Hugo was seen today for follow-up.    Diagnoses and all orders for this visit:    Hyperlipidemia, unspecified hyperlipidemia type    Type 2 diabetes mellitus without complication, without long-term current use of insulin    Other orders  -     atorvastatin (LIPITOR) 10 MG tablet; Take 1 tablet by mouth Daily.  -      esomeprazole (nexIUM) 40 MG capsule; Take 1 capsule by mouth Daily.    Victor Hugo is here today for follow-up.  I'm going to see if his endocrinologist can do some lab work on his cholesterol and have those sent to me.  I have renewed his medications we'll see him back in 6 months unless he has problems sooner.

## 2017-12-26 RX ORDER — ATORVASTATIN CALCIUM 10 MG/1
10 TABLET, FILM COATED ORAL DAILY
Qty: 90 TABLET | Refills: 1 | Status: SHIPPED | OUTPATIENT
Start: 2017-12-26 | End: 2018-07-02 | Stop reason: SDUPTHER

## 2018-02-12 RX ORDER — SITAGLIPTIN 100 MG/1
TABLET, FILM COATED ORAL
Refills: 1 | COMMUNITY
Start: 2017-11-27

## 2018-02-15 ENCOUNTER — HOSPITAL ENCOUNTER (OUTPATIENT)
Facility: HOSPITAL | Age: 48
Setting detail: HOSPITAL OUTPATIENT SURGERY
Discharge: HOME | End: 2018-02-15
Attending: COLON & RECTAL SURGERY | Admitting: COLON & RECTAL SURGERY

## 2018-02-15 ENCOUNTER — ANESTHESIA (OUTPATIENT)
Dept: GASTROENTEROLOGY | Facility: HOSPITAL | Age: 48
End: 2018-02-15

## 2018-02-15 ENCOUNTER — ANESTHESIA EVENT (OUTPATIENT)
Dept: GASTROENTEROLOGY | Facility: HOSPITAL | Age: 48
End: 2018-02-15

## 2018-02-15 VITALS
OXYGEN SATURATION: 94 % | SYSTOLIC BLOOD PRESSURE: 130 MMHG | RESPIRATION RATE: 16 BRPM | TEMPERATURE: 97.6 F | BODY MASS INDEX: 39.36 KG/M2 | HEART RATE: 57 BPM | DIASTOLIC BLOOD PRESSURE: 84 MMHG | WEIGHT: 259.7 LBS | HEIGHT: 68 IN

## 2018-02-15 DIAGNOSIS — K62.5 RECTAL BLEEDING: ICD-10-CM

## 2018-02-15 LAB — GLUCOSE BLDC GLUCOMTR-MCNC: 135 MG/DL (ref 70–130)

## 2018-02-15 PROCEDURE — 25010000002 PROPOFOL 10 MG/ML EMULSION: Performed by: ANESTHESIOLOGY

## 2018-02-15 PROCEDURE — 45398 COLONOSCOPY W/BAND LIGATION: CPT | Performed by: COLON & RECTAL SURGERY

## 2018-02-15 PROCEDURE — 82962 GLUCOSE BLOOD TEST: CPT

## 2018-02-15 PROCEDURE — 45385 COLONOSCOPY W/LESION REMOVAL: CPT | Performed by: COLON & RECTAL SURGERY

## 2018-02-15 PROCEDURE — 88305 TISSUE EXAM BY PATHOLOGIST: CPT | Performed by: COLON & RECTAL SURGERY

## 2018-02-15 RX ORDER — LIDOCAINE HYDROCHLORIDE 20 MG/ML
INJECTION, SOLUTION INFILTRATION; PERINEURAL AS NEEDED
Status: DISCONTINUED | OUTPATIENT
Start: 2018-02-15 | End: 2018-02-15 | Stop reason: SURG

## 2018-02-15 RX ORDER — PROPOFOL 10 MG/ML
VIAL (ML) INTRAVENOUS AS NEEDED
Status: DISCONTINUED | OUTPATIENT
Start: 2018-02-15 | End: 2018-02-15 | Stop reason: SURG

## 2018-02-15 RX ORDER — SODIUM CHLORIDE 0.9 % (FLUSH) 0.9 %
1-10 SYRINGE (ML) INJECTION AS NEEDED
Status: DISCONTINUED | OUTPATIENT
Start: 2018-02-15 | End: 2018-02-15 | Stop reason: HOSPADM

## 2018-02-15 RX ORDER — PROPOFOL 10 MG/ML
VIAL (ML) INTRAVENOUS CONTINUOUS PRN
Status: DISCONTINUED | OUTPATIENT
Start: 2018-02-15 | End: 2018-02-15 | Stop reason: SURG

## 2018-02-15 RX ORDER — SODIUM CHLORIDE, SODIUM LACTATE, POTASSIUM CHLORIDE, CALCIUM CHLORIDE 600; 310; 30; 20 MG/100ML; MG/100ML; MG/100ML; MG/100ML
30 INJECTION, SOLUTION INTRAVENOUS CONTINUOUS PRN
Status: DISCONTINUED | OUTPATIENT
Start: 2018-02-15 | End: 2018-02-15 | Stop reason: HOSPADM

## 2018-02-15 RX ADMIN — LIDOCAINE HYDROCHLORIDE 60 MG: 20 INJECTION, SOLUTION INFILTRATION; PERINEURAL at 15:05

## 2018-02-15 RX ADMIN — SODIUM CHLORIDE, POTASSIUM CHLORIDE, SODIUM LACTATE AND CALCIUM CHLORIDE 30 ML/HR: 600; 310; 30; 20 INJECTION, SOLUTION INTRAVENOUS at 14:48

## 2018-02-15 RX ADMIN — PROPOFOL 100 MG: 10 INJECTION, EMULSION INTRAVENOUS at 15:05

## 2018-02-15 RX ADMIN — SODIUM CHLORIDE, POTASSIUM CHLORIDE, SODIUM LACTATE AND CALCIUM CHLORIDE: 600; 310; 30; 20 INJECTION, SOLUTION INTRAVENOUS at 15:07

## 2018-02-15 RX ADMIN — PROPOFOL 100 MCG/KG/MIN: 10 INJECTION, EMULSION INTRAVENOUS at 15:05

## 2018-02-15 NOTE — ANESTHESIA PREPROCEDURE EVALUATION
Anesthesia Evaluation     Patient summary reviewed and Nursing notes reviewed                Airway   Mallampati: I  TM distance: <3 FB  Neck ROM: full  no difficulty expected  Dental - normal exam     Pulmonary - normal exam   (+) sleep apnea,   Cardiovascular - normal exam    (+) hyperlipidemia      Neuro/Psych- negative ROS  GI/Hepatic/Renal/Endo    (+) obesity,  hiatal hernia, GERD, liver disease, diabetes mellitus,     Musculoskeletal     (+) back pain,   Abdominal  - normal exam    Bowel sounds: normal.   Substance History - negative use     OB/GYN negative ob/gyn ROS         Other                        Anesthesia Plan    ASA 3     MAC     Anesthetic plan and risks discussed with patient.

## 2018-02-15 NOTE — ANESTHESIA POSTPROCEDURE EVALUATION
"Patient: Victor Hugo Monet    Procedure Summary     Date Anesthesia Start Anesthesia Stop Room / Location    02/15/18 1506 [id number]  [facility] /  [facility]       Procedure Diagnosis Surgeon Provider    HEMORRHOID BANDING (N/A Rectum); COLONOSCOPY to cecum with hot snare polypectomies and hemorrhoid banding (N/A ) Rectal bleeding  (Rectal bleeding [K62.5]) MD Sriram Sanders MD          Anesthesia Type: MAC  Last vitals  BP   (!) 144/102 (02/15/18 1556)   Temp   36.4 °C (97.6 °F) (02/15/18 1441)   Pulse   63 (02/15/18 1556)   Resp   16 (02/15/18 1556)     SpO2   94 % (02/15/18 1556)     Post Anesthesia Care and Evaluation    Patient location during evaluation: PACU  Patient participation: complete - patient participated  Level of consciousness: awake  Pain score: 0  Pain management: adequate  Airway patency: patent  Anesthetic complications: No anesthetic complications  PONV Status: none  Cardiovascular status: acceptable  Respiratory status: acceptable  Hydration status: acceptable    Comments: BP (!) 144/102 (BP Location: Left arm, Patient Position: Sitting)  Pulse 63  Temp 36.4 °C (97.6 °F) (Oral)   Resp 16  Ht 172.7 cm (68\")  Wt 118 kg (259 lb 11.2 oz) Comment: previous entry was an error  SpO2 94%  BMI 39.49 kg/m2      "

## 2018-02-15 NOTE — H&P
Pt c/o problems with painful hemorrhoids  Sits a lot for his job as a  for [employer]  Worse 2 months ago; better now     Has also noted some bleeding     Has tried PrepH cream, Tucks wipes, and PrepH suppositories     No fiber supplements or stool softeners     BMs regular, daily     Does sometimes have to strain     Diagnosed with BM last year; has changed his diet     On metformin, Januvia  Gave him diarrhea at first.  Now on XR, which helped     Hx pancreatitis 2014  GI Dr. Anand     Most recent cy 12/23/2009 Dr. Dubose for n/v, diarrhea, and abd pain.  Random bx neg      Medical History         Past Medical History:   Diagnosis Date   • CPAP (continuous positive airway pressure) dependence     • GERD (gastroesophageal reflux disease)     • Hyperlipidemia     • Obesity     • Pancreatitis 2014,2016     Dr. Anand   • Sleep apnea       ON CPAP.  DR. PATEL   • Stone of salivary duct     • Type 2 diabetes mellitus               Surgical History          Past Surgical History:   Procedure Laterality Date   • ADENOIDECTOMY       • APPENDECTOMY       • COLONOSCOPY   2006     Normal   • ENDOSCOPY   2014     Gastritis   • ERCP       • HERNIA REPAIR         Umbilical and right inguinal            Social History:   reports that he quit smoking about 13 years ago. His smoking use included Cigarettes. He has a 10.00 pack-year smoking history. He has never used smokeless tobacco. He reports that he drinks alcohol. He reports that he does not use illicit drugs.        Marriage status:             Family History   Problem Relation Age of Onset   • Cancer Mother         RENAL KIDNEY CA    • Diabetes Mother     • Hypertension Mother     • Heart disease Mother     • Emphysema Mother     • Lung cancer Father     • Diabetes Father     • Hypertension Father              Current Outpatient Prescriptions:   •  atorvastatin (LIPITOR) 10 MG tablet, Take 1 tablet by mouth Daily., Disp: 90 tablet, Rfl: 1  •  esomeprazole  "(nexIUM) 40 MG capsule, Take 1 capsule by mouth Daily., Disp: 90 capsule, Rfl: 1  •  glucose blood (FREESTYLE LITE) test strip, 1 each by Other route Daily. Use as instructed, Disp: 100 each, Rfl: 1  •  metFORMIN (FORTAMET) 1000 MG (OSM) 24 hr tablet, Take 1,000 mg by mouth Daily With Breakfast., Disp: , Rfl:   •  Probiotic Product (ALIGN PO), Take 1 tablet by mouth., Disp: , Rfl:   •  SITagliptin (JANUVIA) 50 MG tablet, Take 100 mg by mouth Daily., Disp: , Rfl:      Allergy  Review of patient's allergies indicates no known allergies.     Review of Systems   Constitution: Negative for decreased appetite, weakness and weight gain.   HENT: Negative for congestion, hearing loss and hoarse voice.    Eyes: Negative for blurred vision, discharge and visual disturbance.   Cardiovascular: Negative for chest pain, cyanosis and leg swelling.   Respiratory: Negative for cough, shortness of breath, sleep disturbances due to breathing and snoring.    Endocrine: Negative for cold intolerance and heat intolerance.   Hematologic/Lymphatic: Does not bruise/bleed easily.   Skin: Negative for itching, poor wound healing and skin cancer.   Musculoskeletal: Negative for arthritis, back pain, joint pain and joint swelling.   Gastrointestinal: Negative for abdominal pain, change in bowel habit, bowel incontinence and constipation.   Genitourinary: Negative for bladder incontinence, dysuria and hematuria.   Neurological: Negative for brief paralysis, excessive daytime sleepiness, dizziness, focal weakness and light-headedness.   Psychiatric/Behavioral: Negative for altered mental status and hallucinations. The patient does not have insomnia.    Allergic/Immunologic: Negative for HIV exposure and persistent infections.   All other systems reviewed and are negative.     /70 (BP Location: Left arm, Patient Position: Lying)  Pulse 65  Temp 97.6 °F (36.4 °C) (Oral)   Resp 16  Ht 172.7 cm (68\")  Wt 118 kg (259 lb 11.2 oz) Comment: " previous entry was an error  SpO2 95%  BMI 39.49 kg/m2    Physical Exam   Constitutional: He is oriented to person, place, and time. He appears well-developed and well-nourished. No distress.   HENT:   Head: Normocephalic and atraumatic.   Nose: Nose normal.   Mouth/Throat: Oropharynx is clear and moist.   Eyes: Conjunctivae and EOM are normal. Pupils are equal, round, and reactive to light.   Neck: Normal range of motion. No tracheal deviation present.   Pulmonary/Chest: Effort normal and breath sounds normal. No respiratory distress.   Abdominal: Soft. Bowel sounds are normal. He exhibits no distension.   Genitourinary:   Genitourinary Comments: Perianal exam: external hem - wnl  TUCKER- good tone, no masses  Anoscopy performed:  Grade 2 x 3 internal hem   Musculoskeletal: Normal range of motion. He exhibits no edema or deformity.   Neurological: He is alert and oriented to person, place, and time. No cranial nerve deficit. Coordination and gait normal.   Skin: Skin is warm and dry.   Psychiatric: He has a normal mood and affect. His behavior is normal. Judgment normal.         Review of Medical Record:  I reviewed Dr. Oseguera records: pt called and requested referral for hemorrhoids  Also reviewed 2009 cy Dr. Dubose: random bx neg     Assessment:  1. Rectal bleeding    2. Internal hemorrhoids with complication          Plan:     For the rectal bleeding, I recommend colonoscopy to rule out major sources of bleeding other than hemorrhoids. At the time of colonoscopy if there are no serious issues found at that time, I recommend doing rubber band ligation of the enlarged internal hemorrhoids.  I described risk, benefits and alternatives to the patient.  I described to patient typical post procedure recovery, typical outcomes, and 85% success rate. The patient wishes to proceed.     I

## 2018-02-17 LAB
CYTO UR: NORMAL
LAB AP CASE REPORT: NORMAL
Lab: NORMAL
PATH REPORT.FINAL DX SPEC: NORMAL
PATH REPORT.GROSS SPEC: NORMAL

## 2018-07-02 RX ORDER — ATORVASTATIN CALCIUM 10 MG/1
10 TABLET, FILM COATED ORAL DAILY
Qty: 90 TABLET | Refills: 0 | Status: SHIPPED | OUTPATIENT
Start: 2018-07-02 | End: 2018-10-03 | Stop reason: SDUPTHER

## 2018-09-07 ENCOUNTER — OFFICE VISIT (OUTPATIENT)
Dept: FAMILY MEDICINE CLINIC | Facility: CLINIC | Age: 48
End: 2018-09-07

## 2018-09-07 VITALS
HEIGHT: 68 IN | HEART RATE: 63 BPM | SYSTOLIC BLOOD PRESSURE: 140 MMHG | TEMPERATURE: 98.6 F | OXYGEN SATURATION: 98 % | WEIGHT: 266 LBS | DIASTOLIC BLOOD PRESSURE: 90 MMHG | BODY MASS INDEX: 40.32 KG/M2

## 2018-09-07 DIAGNOSIS — E78.5 HYPERLIPIDEMIA, UNSPECIFIED HYPERLIPIDEMIA TYPE: Primary | ICD-10-CM

## 2018-09-07 DIAGNOSIS — J06.9 UPPER RESPIRATORY TRACT INFECTION, UNSPECIFIED TYPE: ICD-10-CM

## 2018-09-07 PROCEDURE — 99214 OFFICE O/P EST MOD 30 MIN: CPT | Performed by: INTERNAL MEDICINE

## 2018-09-07 RX ORDER — MONTELUKAST SODIUM 10 MG/1
10 TABLET ORAL NIGHTLY
Qty: 30 TABLET | Refills: 1 | Status: SHIPPED | OUTPATIENT
Start: 2018-09-07 | End: 2020-11-18

## 2018-09-07 RX ORDER — BENZONATATE 100 MG/1
100 CAPSULE ORAL 3 TIMES DAILY PRN
Qty: 30 CAPSULE | Refills: 0 | Status: SHIPPED | OUTPATIENT
Start: 2018-09-07 | End: 2019-03-04

## 2018-09-07 RX ORDER — FLUTICASONE PROPIONATE 50 MCG
2 SPRAY, SUSPENSION (ML) NASAL DAILY
Qty: 16 G | Refills: 1 | Status: SHIPPED | OUTPATIENT
Start: 2018-09-07 | End: 2020-11-18

## 2018-09-07 RX ORDER — ESOMEPRAZOLE MAGNESIUM 40 MG/1
40 CAPSULE, DELAYED RELEASE ORAL DAILY
Qty: 90 CAPSULE | Refills: 1 | Status: SHIPPED | OUTPATIENT
Start: 2018-09-07 | End: 2019-04-08 | Stop reason: SDUPTHER

## 2018-09-07 RX ORDER — DOXYCYCLINE HYCLATE 100 MG
100 TABLET ORAL 2 TIMES DAILY
Qty: 14 TABLET | Refills: 0 | Status: SHIPPED | OUTPATIENT
Start: 2018-09-07 | End: 2020-11-18

## 2018-09-07 RX ORDER — METFORMIN HYDROCHLORIDE 1000 MG/1
TABLET, FILM COATED, EXTENDED RELEASE ORAL
Refills: 1 | COMMUNITY
Start: 2018-09-01 | End: 2022-08-17 | Stop reason: SDUPTHER

## 2018-09-07 NOTE — PROGRESS NOTES
Subjective chief complaint is follow-up for cholesterol but also cough and fever  Victor Hugo Monet is a 48 y.o. male.     History of Present Illness   Victor Hugo is here today for follow-up.  He does have hyperlipidemia.  He is on 10 mg of atorvastatin daily.  He has not had any recent cholesterol labs.  He had an A1c in June that was 6.6.  This was performed by his endocrinologist.  Approximately one week ago he began developing respiratory symptoms after his college-age son came home with similar symptoms.  He has had low-grade fever, sore throat, nasal congestion and drainage, and some gastrointestinal symptoms of diarrhea and cramps.  Social history he is a polyp.  He cannot take certain medications.  He has no known drug allergies.  The following portions of the patient's history were reviewed and updated as appropriate: allergies, current medications, past medical history, past social history and problem list.    Review of Systems   Constitutional: Positive for fatigue and fever. Negative for chills.   HENT: Positive for congestion, postnasal drip, rhinorrhea and sore throat.    Respiratory: Positive for cough. Negative for chest tightness and shortness of breath.    Cardiovascular: Negative for chest pain.   Gastrointestinal: Positive for abdominal pain and diarrhea.       Objective   Physical Exam   HENT:   There is bilateral nasal congestion and moderate erythema.  Oropharynx shows some posterior pharyngeal erythema and edema.  Tympanic membranes are normal.   Cardiovascular: Normal rate, regular rhythm and normal heart sounds.    Pulmonary/Chest: Effort normal and breath sounds normal. No respiratory distress. He has no wheezes. He has no rales.   Musculoskeletal: He exhibits no edema.   Lymphadenopathy:     He has no cervical adenopathy.   Nursing note and vitals reviewed.        Assessment/Plan   Victor Hugo was seen today for hyperlipidemia and uri.    Diagnoses and all orders for this visit:    Hyperlipidemia,  unspecified hyperlipidemia type  -     Lipid Panel; Future  -     Comprehensive Metabolic Panel; Future    Upper respiratory tract infection, unspecified type    Other orders  -     montelukast (SINGULAIR) 10 MG tablet; Take 1 tablet by mouth Every Night.  -     fluticasone (FLONASE) 50 MCG/ACT nasal spray; 2 sprays into the nostril(s) as directed by provider Daily.  -     benzonatate (TESSALON) 100 MG capsule; Take 1 capsule by mouth 3 (Three) Times a Day As Needed for Cough.  -     doxycycline (VIBRAMYICN) 100 MG tablet; Take 1 tablet by mouth 2 (Two) Times a Day.  -     esomeprazole (nexIUM) 40 MG capsule; Take 1 capsule by mouth Daily.      Victor Hugo is here today for follow-up.  We are going to set him up for some fasting laboratories which we will call in about.  For his respiratory symptoms we are going to use Singulair, fluticasone, and Tessalon Perles.  I'm also going to prescribe some doxycycline.  He will call me if he gets no better.  I suspect this may however be an enterovirus.

## 2018-09-18 ENCOUNTER — RESULTS ENCOUNTER (OUTPATIENT)
Dept: FAMILY MEDICINE CLINIC | Facility: CLINIC | Age: 48
End: 2018-09-18

## 2018-09-18 DIAGNOSIS — E78.5 HYPERLIPIDEMIA, UNSPECIFIED HYPERLIPIDEMIA TYPE: ICD-10-CM

## 2018-09-19 LAB
ALBUMIN SERPL-MCNC: 4.8 G/DL (ref 3.5–5.2)
ALBUMIN/GLOB SERPL: 1.5 G/DL
ALP SERPL-CCNC: 49 U/L (ref 39–117)
ALT SERPL-CCNC: 51 U/L (ref 1–41)
AST SERPL-CCNC: 31 U/L (ref 1–40)
BILIRUB SERPL-MCNC: 0.4 MG/DL (ref 0.1–1.2)
BUN SERPL-MCNC: 16 MG/DL (ref 6–20)
BUN/CREAT SERPL: 15 (ref 7–25)
CALCIUM SERPL-MCNC: 10.1 MG/DL (ref 8.6–10.5)
CHLORIDE SERPL-SCNC: 101 MMOL/L (ref 98–107)
CHOLEST SERPL-MCNC: 153 MG/DL (ref 0–200)
CO2 SERPL-SCNC: 27.4 MMOL/L (ref 22–29)
CREAT SERPL-MCNC: 1.07 MG/DL (ref 0.76–1.27)
GLOBULIN SER CALC-MCNC: 3.2 GM/DL
GLUCOSE SERPL-MCNC: 136 MG/DL (ref 65–99)
HDLC SERPL-MCNC: 49 MG/DL (ref 40–60)
LDLC SERPL CALC-MCNC: 57 MG/DL (ref 0–100)
POTASSIUM SERPL-SCNC: 4.8 MMOL/L (ref 3.5–5.2)
PROT SERPL-MCNC: 8 G/DL (ref 6–8.5)
SODIUM SERPL-SCNC: 143 MMOL/L (ref 136–145)
TRIGL SERPL-MCNC: 236 MG/DL (ref 0–150)
VLDLC SERPL CALC-MCNC: 47.2 MG/DL (ref 5–40)

## 2018-10-03 RX ORDER — ATORVASTATIN CALCIUM 10 MG/1
10 TABLET, FILM COATED ORAL DAILY
Qty: 90 TABLET | Refills: 0 | Status: SHIPPED | OUTPATIENT
Start: 2018-10-03 | End: 2019-01-17 | Stop reason: SDUPTHER

## 2019-01-15 DIAGNOSIS — J10.1 INFLUENZA A: Primary | ICD-10-CM

## 2019-01-15 RX ORDER — OSELTAMIVIR PHOSPHATE 75 MG/1
75 CAPSULE ORAL 2 TIMES DAILY
Qty: 10 CAPSULE | Refills: 0 | Status: SHIPPED | OUTPATIENT
Start: 2019-01-15 | End: 2019-03-04

## 2019-01-17 ENCOUNTER — TELEPHONE (OUTPATIENT)
Dept: FAMILY MEDICINE CLINIC | Facility: CLINIC | Age: 49
End: 2019-01-17

## 2019-01-17 RX ORDER — ATORVASTATIN CALCIUM 10 MG/1
10 TABLET, FILM COATED ORAL DAILY
Qty: 90 TABLET | Refills: 0 | Status: SHIPPED | OUTPATIENT
Start: 2019-01-17 | End: 2019-04-08 | Stop reason: SDUPTHER

## 2019-01-17 NOTE — TELEPHONE ENCOUNTER
----- Message from Georgia Cyr sent at 1/17/2019  3:59 PM EST -----  Patient [phone]      Patient tested positive with flu - day 3 of tamiflu - kidney pain - would like to speak with you  Asked him to describe where the pain was located.  It sounds like it is more in the region of the sacroiliac joints bilaterally.  It sounds like it is too low to be pain from his kidneys.  I did advise him that he can use some Tylenol or Advil for this and maintain hydration.

## 2019-02-11 ENCOUNTER — OFFICE VISIT (OUTPATIENT)
Dept: FAMILY MEDICINE CLINIC | Facility: CLINIC | Age: 49
End: 2019-02-11

## 2019-02-11 VITALS
OXYGEN SATURATION: 99 % | HEART RATE: 68 BPM | WEIGHT: 268 LBS | BODY MASS INDEX: 40.62 KG/M2 | HEIGHT: 68 IN | SYSTOLIC BLOOD PRESSURE: 116 MMHG | DIASTOLIC BLOOD PRESSURE: 80 MMHG | TEMPERATURE: 97.7 F

## 2019-02-11 DIAGNOSIS — B02.9 HERPES ZOSTER WITHOUT COMPLICATION: Primary | ICD-10-CM

## 2019-02-11 PROCEDURE — 99213 OFFICE O/P EST LOW 20 MIN: CPT | Performed by: INTERNAL MEDICINE

## 2019-02-11 NOTE — PROGRESS NOTES
Subjective chief complaint is shingles  Victor Hugo Monet is a 48 y.o. male.     History of Present Illness   Victor Hugo here today for complaints of shingles.  He began having a headache approximately one week prior to the breaking out of the shingles.  The headache was fairly severe and was not responding to Tylenol.  Over the weekend he did break out in a rash.  He was seen at [facility] and has been started on valacyclovir.  He is also using some lidocaine topical and triamcinolone ointment.  The pain however is still quite bad.  However the patient is a polyp.  FAA regulations will not allow him to take controlled substances.  We did discuss the possibility of Lyrica but I do not think he could get enough in in terms of benefit before he would have to stop it.  Did discuss that he is not likely to give this to anyone.  Certainly normal catch shingles from him.  He could give chickenpox to someone if they came in contact with blister fluid.  The following portions of the patient's history were reviewed and updated as appropriate: allergies, current medications, past medical history and problem list.    Review of Systems   Eyes: Positive for visual disturbance.   Neurological: Positive for headache.       Objective   Physical Exam   Constitutional: He appears well-developed and well-nourished.   Eyes: Conjunctivae and EOM are normal. Pupils are equal, round, and reactive to light.   Endoscopic exam shows no papilledema   Skin:   Is a herpetic rash in the right scalp.  It does not approach the ophthalmic division.   Nursing note and vitals reviewed.        Assessment/Plan   Victor Hugo was seen today for follow-up.    Diagnoses and all orders for this visit:    Herpes zoster without complication      Victor Hugo is here today for shingles.  Unfortunately because he is a polyp he cannot take any of the medicine such as gabapentin or Lyrica to help with the discomfort.  He is going to continue to use the lidocaine  and triamcinolone.

## 2019-03-04 ENCOUNTER — TELEPHONE (OUTPATIENT)
Dept: FAMILY MEDICINE CLINIC | Facility: CLINIC | Age: 49
End: 2019-03-04

## 2019-03-04 RX ORDER — LIDOCAINE 50 MG/G
OINTMENT TOPICAL
Qty: 50 G | Refills: 2 | Status: SHIPPED | OUTPATIENT
Start: 2019-03-04 | End: 2020-11-18

## 2019-03-04 NOTE — TELEPHONE ENCOUNTER
----- Message from Madison Carver sent at 3/4/2019 10:55 AM EST -----  PT NEEDS A NEW RX:  #VALACYCLOVIR 1GM 1 BID #21  #LIDOCAIN 15GM APPLY TO AREA AS NEED  PHARM#[phone] DION[facility]  PT'S#[id number]  PT IS A  AND LEAVING OUT THIS AFTERNOON   HE IS BREAKING OUT AGAIN  I advised the patient that he is not likely to break out with shingles again.  He is having pain in the distribution of the previous shingles.  He has a Poliquin cannot take controlled substances.  I did advise I would prescribe the lidocaine.

## 2019-04-08 RX ORDER — ESOMEPRAZOLE MAGNESIUM 40 MG/1
40 CAPSULE, DELAYED RELEASE ORAL DAILY
Qty: 90 CAPSULE | Refills: 1 | Status: SHIPPED | OUTPATIENT
Start: 2019-04-08 | End: 2020-01-05

## 2019-04-08 RX ORDER — ATORVASTATIN CALCIUM 10 MG/1
10 TABLET, FILM COATED ORAL DAILY
Qty: 90 TABLET | Refills: 0 | Status: SHIPPED | OUTPATIENT
Start: 2019-04-08

## 2020-01-05 RX ORDER — ESOMEPRAZOLE MAGNESIUM 40 MG/1
40 CAPSULE, DELAYED RELEASE ORAL DAILY
Qty: 30 CAPSULE | Refills: 0 | Status: SHIPPED | OUTPATIENT
Start: 2020-01-05 | End: 2020-05-13 | Stop reason: SDUPTHER

## 2020-02-07 RX ORDER — ESOMEPRAZOLE MAGNESIUM 40 MG/1
CAPSULE, DELAYED RELEASE ORAL
Qty: 30 CAPSULE | Refills: 0 | OUTPATIENT
Start: 2020-02-07

## 2020-03-09 RX ORDER — ATORVASTATIN CALCIUM 10 MG/1
10 TABLET, FILM COATED ORAL DAILY
Qty: 90 TABLET | Refills: 0 | OUTPATIENT
Start: 2020-03-09

## 2020-04-20 RX ORDER — ESOMEPRAZOLE MAGNESIUM 40 MG/1
CAPSULE, DELAYED RELEASE ORAL
Qty: 30 CAPSULE | Refills: 0 | OUTPATIENT
Start: 2020-04-20

## 2020-05-13 RX ORDER — ESOMEPRAZOLE MAGNESIUM 40 MG/1
40 CAPSULE, DELAYED RELEASE ORAL DAILY
Qty: 30 CAPSULE | Refills: 1 | Status: SHIPPED | OUTPATIENT
Start: 2020-05-13 | End: 2021-01-05

## 2020-05-13 RX ORDER — ESOMEPRAZOLE MAGNESIUM 40 MG/1
CAPSULE, DELAYED RELEASE ORAL
Qty: 90 CAPSULE | OUTPATIENT
Start: 2020-05-13

## 2020-05-13 NOTE — TELEPHONE ENCOUNTER
PT WILL CALL WHEN HE GETS BACK IN TOWN TO MAKE AN APPT IN JUNE.  CAN YOU GO AHEAD AND REFILL THIS?

## 2020-11-18 ENCOUNTER — TELEMEDICINE (OUTPATIENT)
Dept: FAMILY MEDICINE CLINIC | Facility: CLINIC | Age: 50
End: 2020-11-18

## 2020-11-18 DIAGNOSIS — U07.1 UPPER RESPIRATORY TRACT INFECTION DUE TO COVID-19 VIRUS: Primary | ICD-10-CM

## 2020-11-18 DIAGNOSIS — J06.9 UPPER RESPIRATORY TRACT INFECTION DUE TO COVID-19 VIRUS: Primary | ICD-10-CM

## 2020-11-18 PROCEDURE — 99422 OL DIG E/M SVC 11-20 MIN: CPT | Performed by: INTERNAL MEDICINE

## 2020-11-18 RX ORDER — DEXAMETHASONE 6 MG/1
6 TABLET ORAL DAILY
Qty: 6 TABLET | Refills: 0 | Status: SHIPPED | OUTPATIENT
Start: 2020-11-18 | End: 2021-11-01

## 2020-11-18 RX ORDER — CHLORCYCLIZINE HYDROCHLORIDE AND PSEUDOEPHEDRINE HYDROCHLORIDE 25; 60 MG/1; MG/1
1 TABLET ORAL EVERY 12 HOURS PRN
Qty: 20 TABLET | Refills: 0 | Status: SHIPPED | OUTPATIENT
Start: 2020-11-18 | End: 2021-11-01

## 2020-11-18 NOTE — PROGRESS NOTES
Subjective Chief complaint is COVID-19 and congestion  Victor Hugo Monet is a 50 y.o. male. This was an audio and video enabled telemedicine encounter.      History of Present Illness Victor Hugo is complaining of some congestion.  This became a little bit worse yesterday.  He tested positive for Covid on the 16th of this month.  He began having symptoms several days prior to that after returning from [place].  He does report that numerous other polyps have had this.  He is not going to be flying at least until November 25.  He does have non-insulin-dependent diabetes.  He reports that his last hemoglobin A1c was 6.3 but his sugars are running a little bit higher despite him not eating with this.  He did go to an [facility] care center was given some promethazine DM.  That does not seem to be helping with the more severe congestion.  The following portions of the patient's history were reviewed and updated as appropriate: allergies, current medications, past family history, past medical history, past social history, past surgical history and problem list.    Review of Systems   Constitutional: Positive for fatigue.   HENT: Positive for congestion.    Musculoskeletal: Positive for arthralgias and myalgias.   Neurological: Positive for weakness and headache.       Objective   Physical Exam  Constitutional:       Appearance: Normal appearance.   HENT:      Mouth/Throat:      Comments: His voice certainly sounds nasal as if congested.  Pulmonary:      Comments: Currently respirations do not look labored or tachypneic.  Neurological:      Mental Status: He is alert.           Assessment/Plan   Diagnoses and all orders for this visit:    1. Upper respiratory tract infection due to COVID-19 virus (Primary)

## 2020-12-02 ENCOUNTER — TELEMEDICINE (OUTPATIENT)
Dept: FAMILY MEDICINE CLINIC | Facility: CLINIC | Age: 50
End: 2020-12-02

## 2020-12-02 ENCOUNTER — DOCUMENTATION (OUTPATIENT)
Dept: SURGERY | Facility: CLINIC | Age: 50
End: 2020-12-02

## 2020-12-02 DIAGNOSIS — R06.02 SHORTNESS OF BREATH: ICD-10-CM

## 2020-12-02 DIAGNOSIS — R53.83 FATIGUE, UNSPECIFIED TYPE: ICD-10-CM

## 2020-12-02 DIAGNOSIS — J06.9 UPPER RESPIRATORY TRACT INFECTION DUE TO COVID-19 VIRUS: Primary | ICD-10-CM

## 2020-12-02 DIAGNOSIS — U07.1 UPPER RESPIRATORY TRACT INFECTION DUE TO COVID-19 VIRUS: Primary | ICD-10-CM

## 2020-12-02 PROCEDURE — 99421 OL DIG E/M SVC 5-10 MIN: CPT | Performed by: INTERNAL MEDICINE

## 2020-12-02 NOTE — PROGRESS NOTES
Subjective Chief complaint is follow-up for Covid  Victor Hugo Monet is a 50 y.o. male. This was an audio and video enabled telemedicine encounter. 10 minutes      History of Present Illness Victor Hugo is following up today after testing positive for Covid.  At a video visit approximately the 18th of this month  I did prescribe some dexamethasone because the patient was short of breath.  He initially got some benefit from that but after he finished that he was still having shortness of breath.  He went to another immediate care center.  His lungs were clear at that time.  He is feeling better but he feels quite a bit of fatigue and still gets out of breath with minimal activity.  He did try to do some training on his elliptical and that completely wiped him out.  He is due to [place] to [place]alt[place] airport in [place] on the [place].  With his fatigue and shortness of breath I am not sure that is a good idea.  I did advise him that he may want to use sick days for that.  We did discuss his elevated sugars.  I really suspect that this is coming from his recent steroid treatment.  I would give it another few weeks and not try to Wily medicine on him.    The following portions of the patient's history were reviewed and updated as appropriate: allergies, current medications, past family history, past medical history, past social history, past surgical history and problem list.    Review of Systems   Constitutional: Positive for fatigue. Negative for chills and fever.   Respiratory: Positive for shortness of breath.    Neurological: Positive for numbness.       Objective   Physical Exam  Pulmonary:      Comments: Respirations do not appear overly labored.  Neurological:      Mental Status: He is alert.           Assessment/Plan   Diagnoses and all orders for this visit:    1. Upper respiratory tract infection due to COVID-19 virus (Primary)    2. Fatigue, unspecified type    3. Shortness of breath    Victor Hugo is following up  today after being treated with steroids for his Covid symptoms.  He is still short of breath and weak.  I do not think it is a good idea for him to fly at this time.  I am going to keep him off at least till the 16th.  I did advise that his sugars will take another couple weeks to come back to normal.  If they remain up then he should follow back up with his endocrinologist.  Hopefully he will not require further medication.  I did advise that weakness and fatigue are going to be part of this.  I did advise him not to try to overdo things initially.

## 2021-01-05 ENCOUNTER — OFFICE VISIT (OUTPATIENT)
Dept: GASTROENTEROLOGY | Facility: CLINIC | Age: 51
End: 2021-01-05

## 2021-01-05 VITALS — TEMPERATURE: 98.4 F | WEIGHT: 269.4 LBS | HEIGHT: 68 IN | BODY MASS INDEX: 40.83 KG/M2

## 2021-01-05 DIAGNOSIS — K76.0 NAFLD (NONALCOHOLIC FATTY LIVER DISEASE): ICD-10-CM

## 2021-01-05 DIAGNOSIS — K21.00 GASTROESOPHAGEAL REFLUX DISEASE WITH ESOPHAGITIS WITHOUT HEMORRHAGE: ICD-10-CM

## 2021-01-05 DIAGNOSIS — K85.90 PANCREATITIS, RECURRENT: ICD-10-CM

## 2021-01-05 DIAGNOSIS — Z86.010 PERSONAL HISTORY OF COLONIC POLYPS: Primary | ICD-10-CM

## 2021-01-05 PROCEDURE — S0285 CNSLT BEFORE SCREEN COLONOSC: HCPCS | Performed by: INTERNAL MEDICINE

## 2021-01-05 RX ORDER — ESOMEPRAZOLE MAGNESIUM 40 MG/1
40 CAPSULE, DELAYED RELEASE ORAL DAILY
Qty: 90 CAPSULE | Refills: 3 | Status: SHIPPED | OUTPATIENT
Start: 2021-01-05

## 2021-01-05 NOTE — PROGRESS NOTES
PATIENT INFORMATION  Victor Hugo Monet       - 1970    CHIEF COMPLAINT  Chief Complaint   Patient presents with   • Colonoscopy     due for Colonoscopy; last c/s 02/15/2018       HISTORY OF PRESENT ILLNESS  Was seen by Aye Drummond for hemorrhoids and his colon in 2018  Was recommended a three year exam and is off his PPI but doing better and due to Diet change for Diabetes and also had COVID at home and fatigue was the worst.     IS here mostly for his colon oscopy, reviewed his exam with her and has been dong well on his added fiber so overall doing very well but his only issue has been recurrent Pancreatitis but always following an acute4 infection- isnt aware of elevated Triglycerides      REVIEWED PERTINENT RESULTS/ LABS  Lab Results   Component Value Date    CASEREPORT  02/15/2018     Surgical Pathology Report                         Case: [id number]                                  Authorizing Provider:  Aye Drummond MD         Collected:           02/15/2018 03:20 PM          Ordering Location:     [facility]  Received:            02/15/2018 10:21 PM                                 ENDO SUITES                                                                  Pathologist:           Westley Day MD                                                                           Specimens:   1) - Large Intestine, Cecum                                                                         2) - Large Intestine, Right / Ascending Colon                                                       3) - Large Intestine, Left / Descending Colon                                                       4) - Large Intestine, Rectum                                                               FINALDX  02/15/2018     1: CECUM:   HYPERPLASTIC POLYP.   FECAL DEBRIS.     2: RIGHT/ASCENDING COLON:   TUBULAR ADENOMA WITH LOW  GRADE DYSPLASIA.   FECAL DEBRIS.    3: LEFT/DESCENDING COLON:   TUBULAR ADENOMA WITH LOW GRADE DYSPLASIA.   FECAL DEBRIS.    4: RECTUM:   HYPERPLASTIC POLYP.      CMK/gatitoe     CPT CODES:  1: 61544  2: 15543  3: 24902  4: 64042       Lab Results   Component Value Date    HGB 16.0 02/19/2016    MCV 89.8 02/19/2016     02/19/2016    ALT 75 (H) 01/08/2020    AST 50 (H) 01/08/2020    HGBA1C 6.9 (H) 10/06/2020    TRIG 132 06/04/2020      No results found.    REVIEW OF SYSTEMS  Review of Systems   All other systems reviewed and are negative.        ACTIVE PROBLEMS  Patient Active Problem List    Diagnosis   • Rectal bleeding [K62.5]   • Hyperlipidemia [E78.5]   • Type 2 diabetes mellitus without complication (CMS/HCC) [E11.9]   • Obstructive sleep apnea on CPAP [G47.33, Z99.89]   • Obesity due to excess calories [E66.09]   • History of pancreatitis [Z87.19]   • NAFLD (nonalcoholic fatty liver disease) [K76.0]   • Gastroesophageal reflux disease with esophagitis [K21.00]   • Gastritis [K29.70]   • Sleep apnea [G47.30]   • Type 2 diabetes mellitus (CMS/HCC) [E11.9]   • Exomphalos [Q79.2]   • Pancreatitis, recurrent [K85.90]         PAST MEDICAL HISTORY  Past Medical History:   Diagnosis Date   • Exomphalos    • GERD (gastroesophageal reflux disease)    • Hiatal hernia    • History of gastritis    • Hyperlipidemia    • Internal hemorrhoids    • NAFLD (nonalcoholic fatty liver disease)    • Obesity    • Pancreatitis 02/20/2016    RECURRENT, SEEN AT [facility]   • Rectal bleeding    • Sleep apnea     ON CPAP.  DR. PATEL   • Stone of salivary duct    • Strain of lumbar paraspinal muscle 04/04/2002    SEEN AT [facility] ER   • Type 2 diabetes mellitus (CMS/HCC)          SURGICAL HISTORY  Past Surgical History:   Procedure Laterality Date   • ADENOIDECTOMY     • APPENDECTOMY N/A 1980   • COLONOSCOPY N/A 2/15/2018    Procedure: COLONOSCOPY to cecum with hot snare polypectomies and hemorrhoid banding;  Surgeon: Aye Drummond MD;   Location:  [facility];  Service:    • ENDOSCOPY AND COLONOSCOPY N/A 2009    ACUTE GASTRITIS, OTHERWISE EGD WNL, INTERNAL HEMORRHOIDS, OTHERWISE CY WNL, DR. NICKI CAVANAUGH AT [facility]   • ERCP     • HEMORRHOIDECTOMY N/A 2/15/2018    Procedure: HEMORRHOID BANDING;  Surgeon: Aye Drummond MD;  Location: [facility]COPY;  Service:    • HERNIA REPAIR  2005    Umbilical and right inguinal,DR. EASON   • SUBMANDIBULAR DUCT STONE REMOVAL Left 06/15/2017    DR. EDGAR CASILLAS         FAMILY HISTORY  Family History   Problem Relation Age of Onset   • Cancer Mother         RENAL KIDNEY CA    • Diabetes Mother    • Hypertension Mother    • Heart disease Mother    • Emphysema Mother    • Kidney cancer Mother    • Lung cancer Father    • Diabetes Father    • Hypertension Father    • No Known Problems Son    • Heart disease Maternal Grandfather    • Diabetes Paternal Grandmother    • Heart disease Paternal Grandfather          SOCIAL HISTORY  Social History     Occupational History   • Not on file   Tobacco Use   • Smoking status: Former Smoker     Packs/day: 1.00     Years: 10.00     Pack years: 10.00     Types: Cigarettes     Quit date:      Years since quittin.0   • Smokeless tobacco: Never Used   Substance and Sexual Activity   • Alcohol use: Yes     Comment: SOCIALLY    • Drug use: No   • Sexual activity: Defer         CURRENT MEDICATIONS    Current Outpatient Medications:   •  atorvastatin (LIPITOR) 10 MG tablet, Take 1 tablet by mouth Daily., Disp: 90 tablet, Rfl: 0  •  Chlorcyclizine-Pseudoephed (Stahist AD) 25-60 MG tablet, Take 1 tablet by mouth Every 12 (Twelve) Hours As Needed (congestion)., Disp: 20 tablet, Rfl: 0  •  dexamethasone (DECADRON) 6 MG tablet, Take 1 tablet by mouth Daily., Disp: 6 tablet, Rfl: 0  •  glucose blood (FREESTYLE LITE) test strip, 1 each by Other route Daily. Use as instructed, Disp: 100 each, Rfl: 1  •  JANUVIA 100 MG tablet, TK 1 T PO  QD, Disp: , Rfl: 1  •  metFORMIN (GLUMETZA)  "1000 MG (MOD) 24 hr tablet, TK 1 T PO BID WITH MEALS, Disp: , Rfl: 1  •  Probiotic Product (ALIGN PO), Take 1 tablet by mouth., Disp: , Rfl:     ALLERGIES  Patient has no known allergies.    VITALS  Vitals:    01/05/21 1011   Temp: 98.4 °F (36.9 °C)   TempSrc: Temporal   Weight: 122 kg (269 lb 6.4 oz)   Height: 172.7 cm (68\")       PHYSICAL EXAM  Debilities/Disabilities Identified: None  Emotional Behavior: Appropriate  Wt Readings from Last 3 Encounters:   01/05/21 122 kg (269 lb 6.4 oz)   02/11/19 122 kg (268 lb)   09/07/18 121 kg (266 lb)     Ht Readings from Last 1 Encounters:   01/05/21 172.7 cm (68\")     Body mass index is 40.96 kg/m².  Physical Exam  Constitutional:       Appearance: He is well-developed.   HENT:      Head: Normocephalic and atraumatic.      Nose: Nose normal.   Eyes:      General: No scleral icterus.     Pupils: Pupils are equal, round, and reactive to light.   Neck:      Musculoskeletal: Normal range of motion and neck supple.      Thyroid: No thyromegaly.   Cardiovascular:      Rate and Rhythm: Normal rate and regular rhythm.      Heart sounds: Normal heart sounds. No murmur. No gallop.    Pulmonary:      Effort: Pulmonary effort is normal.      Breath sounds: Normal breath sounds. No wheezing or rales.   Abdominal:      General: Bowel sounds are normal. There is no distension or abdominal bruit.      Palpations: Abdomen is soft. Abdomen is not rigid. There is no shifting dullness, fluid wave, hepatomegaly, splenomegaly, mass or pulsatile mass.      Tenderness: There is no abdominal tenderness. There is no guarding or rebound. Negative signs include Charlton's sign.      Hernia: No hernia is present. There is no hernia in the ventral area.   Musculoskeletal: Normal range of motion.   Lymphadenopathy:      Cervical: No cervical adenopathy.   Skin:     General: Skin is warm and dry.      Findings: No erythema or rash.   Neurological:      Mental Status: He is alert and oriented to person, " place, and time.   Psychiatric:         Mood and Affect: Mood normal.         Behavior: Behavior normal.         CLINICAL DATA REVIEWED   reviewed previous lab results and integrated with today's visit, reviewed notes from other physicians and/or last GI encounter, reviewed previous endoscopy results and available photos, reviewed surgical pathology results from previous biopsies    ASSESSMENT  Diagnoses and all orders for this visit:    Personal history of colonic polyps  -     External Facility Surgical / Procedural Request; Future    Other orders  -     Obtain informed consent; Future          PLAN  No follow-ups on file.    I have discussed the above plan with the patient.  They verbalize understanding and are in agreement with the plan.  They have been advised to contact the office for any questions, concerns, or changes related to their health.

## 2021-01-28 ENCOUNTER — PREP FOR SURGERY (OUTPATIENT)
Dept: SURGERY | Facility: SURGERY CENTER | Age: 51
End: 2021-01-28

## 2021-01-28 DIAGNOSIS — Z12.11 ENCOUNTER FOR SCREENING FOR MALIGNANT NEOPLASM OF COLON: Primary | ICD-10-CM

## 2021-01-28 DIAGNOSIS — Z86.010 PERSONAL HISTORY OF COLONIC POLYPS: ICD-10-CM

## 2021-01-28 PROBLEM — Z86.0100 PERSONAL HISTORY OF COLONIC POLYPS: Status: ACTIVE | Noted: 2021-01-28

## 2021-02-17 ENCOUNTER — TRANSCRIBE ORDERS (OUTPATIENT)
Dept: LAB | Facility: SURGERY CENTER | Age: 51
End: 2021-02-17

## 2021-02-17 DIAGNOSIS — Z01.818 OTHER SPECIFIED PRE-OPERATIVE EXAMINATION: Primary | ICD-10-CM

## 2021-04-07 ENCOUNTER — TELEPHONE (OUTPATIENT)
Dept: GASTROENTEROLOGY | Facility: CLINIC | Age: 51
End: 2021-04-07

## 2021-04-07 NOTE — TELEPHONE ENCOUNTER
PATIENT CALLED STATING SCHEDULED FOR COLONOSCOPY ON 04/13/2021.  NEEDS TO CANCEL FOR NOW.  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  WILL CALL BACK TO RESCHEDULE.

## 2021-04-10 ENCOUNTER — APPOINTMENT (OUTPATIENT)
Dept: LAB | Facility: SURGERY CENTER | Age: 51
End: 2021-04-10

## 2021-11-01 ENCOUNTER — OFFICE VISIT (OUTPATIENT)
Dept: FAMILY MEDICINE CLINIC | Facility: CLINIC | Age: 51
End: 2021-11-01

## 2021-11-01 VITALS
SYSTOLIC BLOOD PRESSURE: 130 MMHG | WEIGHT: 247 LBS | OXYGEN SATURATION: 97 % | BODY MASS INDEX: 37.44 KG/M2 | HEIGHT: 68 IN | DIASTOLIC BLOOD PRESSURE: 88 MMHG | HEART RATE: 59 BPM

## 2021-11-01 DIAGNOSIS — I10 ESSENTIAL (PRIMARY) HYPERTENSION: ICD-10-CM

## 2021-11-01 DIAGNOSIS — E11.9 TYPE 2 DIABETES MELLITUS WITHOUT COMPLICATION, WITHOUT LONG-TERM CURRENT USE OF INSULIN (HCC): Primary | ICD-10-CM

## 2021-11-01 PROCEDURE — 99214 OFFICE O/P EST MOD 30 MIN: CPT | Performed by: INTERNAL MEDICINE

## 2021-11-01 RX ORDER — PIOGLITAZONEHYDROCHLORIDE 15 MG/1
TABLET ORAL
COMMUNITY
Start: 2021-09-14

## 2021-11-01 RX ORDER — LOSARTAN POTASSIUM 50 MG/1
50 TABLET ORAL DAILY
Qty: 90 TABLET | Refills: 1 | Status: SHIPPED | OUTPATIENT
Start: 2021-11-01 | End: 2022-05-23

## 2021-11-01 NOTE — PROGRESS NOTES
Subjective Chief complaint is follow-up  Victor Hugo Monet is a 51 y.o. male.     History of Present Illness is here today for follow-up.  He is generally seeing a endocrinologist for his diabetes.  His last hemoglobin A1c in August was 6.9 but last 2 months he reports that his sugars are running higher.  He does have hyperlipidemia.  He is on atorvastatin.  His cholesterol panel looked okay in August.  His blood pressure today when I retook it was 138/86.  I did advise with his diabetes that number really needs to be probably 125/75 or below.  He is a  and there are certain medic since he might not be able to take.  Advised we would start losartan which will likely be fine on any list of medicines.  He did have COVID-19.  He was affected with respiratory symptoms for approximately 1 month but now is doing okay and is back to exercising without chest pain or shortness of breath.  The following portions of the patient's history were reviewed and updated as appropriate: allergies, current medications, past family history, past medical history, past social history, past surgical history and problem list.    Review of Systems   Constitutional: Negative for chills and fever.   Respiratory: Negative for chest tightness and shortness of breath.    Cardiovascular: Negative for chest pain.   Neurological: Negative for dizziness, light-headedness and headache.       Objective   Physical Exam  Vitals and nursing note reviewed.   Constitutional:       Appearance: Normal appearance.   Cardiovascular:      Rate and Rhythm: Normal rate and regular rhythm.      Pulses: Normal pulses.   Pulmonary:      Effort: Pulmonary effort is normal.      Breath sounds: Normal breath sounds. No wheezing, rhonchi or rales.   Musculoskeletal:      Right lower leg: No edema.      Left lower leg: No edema.   Neurological:      Mental Status: He is alert.           Assessment/Plan   Diagnoses and all orders for this visit:    1. Type 2 diabetes  mellitus without complication, without long-term current use of insulin (HCC) (Primary)    2. Essential (primary) hypertension    Other orders  -     losartan (Cozaar) 50 MG tablet; Take 1 tablet by mouth Daily.  Dispense: 90 tablet; Refill: 1    Albert is here today for follow-up.  We are going to start some low-dose losartan because of his diabetes and elevated blood pressure.  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  Advised him to return in approximately 6 weeks

## 2022-01-13 ENCOUNTER — OFFICE VISIT (OUTPATIENT)
Dept: FAMILY MEDICINE CLINIC | Facility: CLINIC | Age: 52
End: 2022-01-13

## 2022-01-13 VITALS
HEIGHT: 68 IN | OXYGEN SATURATION: 96 % | SYSTOLIC BLOOD PRESSURE: 120 MMHG | BODY MASS INDEX: 37.44 KG/M2 | WEIGHT: 247 LBS | DIASTOLIC BLOOD PRESSURE: 82 MMHG | HEART RATE: 74 BPM

## 2022-01-13 DIAGNOSIS — I10 ESSENTIAL (PRIMARY) HYPERTENSION: Primary | ICD-10-CM

## 2022-01-13 DIAGNOSIS — Z86.16 HISTORY OF COVID-19: ICD-10-CM

## 2022-01-13 PROCEDURE — 99213 OFFICE O/P EST LOW 20 MIN: CPT | Performed by: INTERNAL MEDICINE

## 2022-01-13 RX ORDER — GLIPIZIDE 5 MG/1
TABLET ORAL
COMMUNITY
Start: 2021-12-23

## 2022-01-13 NOTE — PROGRESS NOTES
Answers for HPI/ROS submitted by the patient on 1/13/2022  What is the primary reason for your visit?: Other  Please describe your symptoms.: Covid recovery follow up  Have you had these symptoms before?: Yes  How long have you been having these symptoms?: 1-2 weeks    Subjective Chief complaint is COVID recovery follow-up and follow-up for blood pressure  Victor Hugo Monet is a 51 y.o. male.     History of Present Illness Victor Hugo is here today for follow-up.  At his visit in November his blood pressure was a little bit elevated.  We did start him on low-dose losartan.  His blood pressure today is much improved.  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  They are under the misconception that he was hospitalized for COVID.  Patient did contract COVID in November 2020.  We did see him by several video visits.  He did not require hospitalization.  Because of some shortness of breath we did treat him with some steroid.  After completing the steroids he still had a little bit of shortness of breath but that resolved after a few weeks..  His lungs are now clear.  He has no residual symptoms from COVID.    The following portions of the patient's history were reviewed and updated as appropriate: allergies, current medications, past family history, past medical history, past social history, past surgical history and problem list.    Review of Systems   Constitutional: Negative for chills and fever.   Respiratory: Negative for cough, chest tightness and shortness of breath.        Objective   Physical Exam  Constitutional:       Appearance: Normal appearance.   Cardiovascular:      Rate and Rhythm: Normal rate and regular rhythm.      Pulses: Normal pulses.      Heart sounds: No murmur heard.  No friction rub. No gallop.    Pulmonary:      Effort: Pulmonary effort is normal.      Breath sounds: No wheezing, rhonchi or rales.   Abdominal:      General: Bowel sounds are normal.      Palpations: Abdomen is soft.       Tenderness: There is no abdominal tenderness. There is no guarding or rebound.   Musculoskeletal:      Right lower leg: No edema.      Left lower leg: No edema.   Neurological:      Mental Status: He is alert.           Assessment/Plan   Diagnoses and all orders for this visit:    1. Essential (primary) hypertension (Primary)    2. History of anaphylaxis to COVID-19 vaccine    Victor Hugo is here today for follow-up.  His blood pressure certainly is much improved and we will continue the losartan.  He really has no sequelae from his remote COVID infection in November 2020.  He certainly did not require hospitalization.  There are no hospitalization records to provide.

## 2022-01-27 ENCOUNTER — TELEPHONE (OUTPATIENT)
Dept: FAMILY MEDICINE CLINIC | Facility: CLINIC | Age: 52
End: 2022-01-27

## 2022-01-27 NOTE — TELEPHONE ENCOUNTER
Caller: Victor Hugo Monet    Relationship: Self    Best call back number: [phone]      What is the best time to reach you: ANYTIME    Who are you requesting to speak with (clinical staff, provider,  specific staff member): MA OR DOCTOR        What was the call regarding: PATIENT IS ABOUT 3 WEEKS INTO COVID AND HE IS STILL NOT WELL. HAVING A LOT OF COUGHING AND SHORTNESS OF AIR SOMETIMES AND HE HAS DIZZY SPELLS AND HAS TO GO BACK TO WORK Monday AND HE IS A  AND IS NOT SURE WHAT TO DO. TRIED TO MAKE APPT NONE AVAILABLE UNTIL NEXT WED.     Do you require a callback: YES    Advised we may need to keep him off work longer. Can try an inhaler   Statement Selected

## 2022-02-02 ENCOUNTER — OFFICE VISIT (OUTPATIENT)
Dept: FAMILY MEDICINE CLINIC | Facility: CLINIC | Age: 52
End: 2022-02-02

## 2022-02-02 VITALS
HEIGHT: 68 IN | SYSTOLIC BLOOD PRESSURE: 118 MMHG | DIASTOLIC BLOOD PRESSURE: 78 MMHG | WEIGHT: 273 LBS | OXYGEN SATURATION: 96 % | BODY MASS INDEX: 41.37 KG/M2 | HEART RATE: 87 BPM

## 2022-02-02 DIAGNOSIS — U07.1 PNEUMONIA DUE TO COVID-19 VIRUS: ICD-10-CM

## 2022-02-02 DIAGNOSIS — R42 DIZZINESS: ICD-10-CM

## 2022-02-02 DIAGNOSIS — J12.82 PNEUMONIA DUE TO COVID-19 VIRUS: ICD-10-CM

## 2022-02-02 DIAGNOSIS — R05.9 COUGH: Primary | ICD-10-CM

## 2022-02-02 PROCEDURE — 99214 OFFICE O/P EST MOD 30 MIN: CPT | Performed by: INTERNAL MEDICINE

## 2022-02-02 RX ORDER — BENZONATATE 200 MG/1
200 CAPSULE ORAL 3 TIMES DAILY
Qty: 30 CAPSULE | Refills: 1 | Status: SHIPPED | OUTPATIENT
Start: 2022-02-02 | End: 2022-08-17

## 2022-02-02 NOTE — PROGRESS NOTES
Subjective Chief complaint is cough and dizziness  Victor Hugo Monet is a 51 y.o. male.     History of Present Illness Albert is here today for cough and dizziness.  He did have Covid.  He seemed to recover from that.  But he still has some cough.  He is back to trying to exercise.  Sometimes with exertion he gets a little bit lightheaded.  He has a polyp and he is concerned about the lightheadedness in terms of flying.  His oxygen saturation here today looks to be at baseline.  His respirations do not appear to be labored.  I did review some lab work he had from his endocrinologist.  They have not really checked a CBC or chemistry panel on him.    The following portions of the patient's history were reviewed and updated as appropriate: allergies, current medications, past family history, past medical history, past social history, past surgical history and problem list.    Review of Systems   Constitutional: Positive for fatigue. Negative for chills and fever.   Respiratory: Positive for cough. Negative for chest tightness and shortness of breath.    Cardiovascular: Negative for chest pain.   Neurological: Positive for light-headedness.       Objective   Physical Exam  Vitals and nursing note reviewed.   Constitutional:       Appearance: Normal appearance.   Cardiovascular:      Rate and Rhythm: Normal rate and regular rhythm.   Pulmonary:      Effort: Pulmonary effort is normal.      Breath sounds: No wheezing, rhonchi or rales.   Neurological:      General: No focal deficit present.      Mental Status: He is alert.      Cranial Nerves: No cranial nerve deficit.      Coordination: Coordination normal.           Assessment/Plan   Diagnoses and all orders for this visit:    1. Cough (Primary)  -     CT Chest Without Contrast Diagnostic; Future    2. Dizziness  -     CBC & Differential  -     Comprehensive Metabolic Panel    3. Pneumonia due to COVID-19 virus  -     CT Chest Without Contrast Diagnostic; Future    Other  orders  -     benzonatate (TESSALON) 200 MG capsule; Take 1 capsule by mouth 3 (Three) Times a Day.  Dispense: 30 capsule; Refill: 1      Albert is here today for follow-up.  He is continuing to have a cough.  The steroid and long-acting bronchodilator inhaler has helped a little bit.  I am going to going get a chest CT.  We are also going to check some lab work for his dizziness.  Neurologically he appears to be intact.  I suspect he may just be having to hyperventilate a little bit and that may be causing some of his lightheadedness.

## 2022-02-03 LAB
ALBUMIN SERPL-MCNC: 4.8 G/DL (ref 3.8–4.9)
ALBUMIN/GLOB SERPL: 1.7 {RATIO} (ref 1.2–2.2)
ALP SERPL-CCNC: 59 IU/L (ref 44–121)
ALT SERPL-CCNC: 68 IU/L (ref 0–44)
AST SERPL-CCNC: 45 IU/L (ref 0–40)
BASOPHILS # BLD AUTO: 0.1 X10E3/UL (ref 0–0.2)
BASOPHILS NFR BLD AUTO: 1 %
BILIRUB SERPL-MCNC: 0.3 MG/DL (ref 0–1.2)
BUN SERPL-MCNC: 18 MG/DL (ref 6–24)
BUN/CREAT SERPL: 17 (ref 9–20)
CALCIUM SERPL-MCNC: 9.9 MG/DL (ref 8.7–10.2)
CHLORIDE SERPL-SCNC: 98 MMOL/L (ref 96–106)
CO2 SERPL-SCNC: 20 MMOL/L (ref 20–29)
CREAT SERPL-MCNC: 1.03 MG/DL (ref 0.76–1.27)
EOSINOPHIL # BLD AUTO: 0.1 X10E3/UL (ref 0–0.4)
EOSINOPHIL NFR BLD AUTO: 2 %
ERYTHROCYTE [DISTWIDTH] IN BLOOD BY AUTOMATED COUNT: 12.5 % (ref 11.6–15.4)
GLOBULIN SER CALC-MCNC: 2.8 G/DL (ref 1.5–4.5)
GLUCOSE SERPL-MCNC: 194 MG/DL (ref 65–99)
HCT VFR BLD AUTO: 49.8 % (ref 37.5–51)
HGB BLD-MCNC: 16.8 G/DL (ref 13–17.7)
IMM GRANULOCYTES # BLD AUTO: 0 X10E3/UL (ref 0–0.1)
IMM GRANULOCYTES NFR BLD AUTO: 1 %
LYMPHOCYTES # BLD AUTO: 2 X10E3/UL (ref 0.7–3.1)
LYMPHOCYTES NFR BLD AUTO: 24 %
MCH RBC QN AUTO: 30.5 PG (ref 26.6–33)
MCHC RBC AUTO-ENTMCNC: 33.7 G/DL (ref 31.5–35.7)
MCV RBC AUTO: 91 FL (ref 79–97)
MONOCYTES # BLD AUTO: 0.7 X10E3/UL (ref 0.1–0.9)
MONOCYTES NFR BLD AUTO: 8 %
NEUTROPHILS # BLD AUTO: 5.2 X10E3/UL (ref 1.4–7)
NEUTROPHILS NFR BLD AUTO: 64 %
PLATELET # BLD AUTO: 161 X10E3/UL (ref 150–450)
POTASSIUM SERPL-SCNC: 4.5 MMOL/L (ref 3.5–5.2)
PROT SERPL-MCNC: 7.6 G/DL (ref 6–8.5)
RBC # BLD AUTO: 5.5 X10E6/UL (ref 4.14–5.8)
SODIUM SERPL-SCNC: 139 MMOL/L (ref 134–144)
WBC # BLD AUTO: 8.1 X10E3/UL (ref 3.4–10.8)

## 2022-02-10 ENCOUNTER — HOSPITAL ENCOUNTER (OUTPATIENT)
Dept: CT IMAGING | Facility: HOSPITAL | Age: 52
Discharge: HOME | End: 2022-02-10
Admitting: INTERNAL MEDICINE

## 2022-02-10 DIAGNOSIS — J12.82 PNEUMONIA DUE TO COVID-19 VIRUS: ICD-10-CM

## 2022-02-10 DIAGNOSIS — R05.9 COUGH: ICD-10-CM

## 2022-02-10 DIAGNOSIS — U07.1 PNEUMONIA DUE TO COVID-19 VIRUS: ICD-10-CM

## 2022-02-10 PROCEDURE — 71250 CT THORAX DX C-: CPT

## 2022-02-11 ENCOUNTER — TELEPHONE (OUTPATIENT)
Dept: FAMILY MEDICINE CLINIC | Facility: CLINIC | Age: 52
End: 2022-02-11

## 2022-02-11 NOTE — TELEPHONE ENCOUNTER
Caller: Victor Hugo Monet    Relationship to patient: Self    Best call back number: [phone] (M)    Patient is needing: PATIENT CALLED IN STATING HE WAS RETURNING A CALL TO HIS PCP FROM LAST NIGHT REGARDING HIS CT SCAN FROM LAST NIGHT. ATTEMPTED TO WARM TRANSFER, UNSUCCESSFUL. PLEASE RETURN PATIENTS CALL. THANK YOU    Results were discussed with the patient.  He still has a cough but his CT scan looks good.  We did discuss the small lymph node I do not think that is anything to worry about.  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

## 2022-05-23 RX ORDER — LOSARTAN POTASSIUM 50 MG/1
TABLET ORAL
Qty: 90 TABLET | Refills: 3 | Status: SHIPPED | OUTPATIENT
Start: 2022-05-23

## 2022-08-17 ENCOUNTER — OFFICE VISIT (OUTPATIENT)
Dept: FAMILY MEDICINE CLINIC | Facility: CLINIC | Age: 52
End: 2022-08-17

## 2022-08-17 VITALS
OXYGEN SATURATION: 97 % | SYSTOLIC BLOOD PRESSURE: 122 MMHG | WEIGHT: 272 LBS | HEART RATE: 68 BPM | BODY MASS INDEX: 41.22 KG/M2 | HEIGHT: 68 IN | DIASTOLIC BLOOD PRESSURE: 84 MMHG

## 2022-08-17 DIAGNOSIS — I10 ESSENTIAL (PRIMARY) HYPERTENSION: Primary | ICD-10-CM

## 2022-08-17 DIAGNOSIS — E11.9 TYPE 2 DIABETES MELLITUS WITHOUT COMPLICATION, WITHOUT LONG-TERM CURRENT USE OF INSULIN: ICD-10-CM

## 2022-08-17 PROCEDURE — 99213 OFFICE O/P EST LOW 20 MIN: CPT | Performed by: INTERNAL MEDICINE

## 2022-08-17 RX ORDER — METFORMIN HYDROCHLORIDE EXTENDED-RELEASE TABLETS 1000 MG/1
TABLET, FILM COATED, EXTENDED RELEASE ORAL
COMMUNITY
Start: 2022-07-25

## 2022-08-17 NOTE — PROGRESS NOTES
Subjective Chief complaint is checkup on blood pressure  Victor Hugo Monet is a 52 y.o. male.     History of Present Illness Albert is here today for checkup on his blood pressure.  He is on losartan.  He is well controlled with this.  He is not having any problems with the medication.  He is not having headaches, dizziness, chest pain, or shortness of breath.  He does have diabetes.  He is followed by an endocrinologist.  He had lab work done yesterday and I am able to see that.  It looks like his A1c is improving and remains below 7 at 6.8.  His cholesterol panel looks excellent.  His creatinine was normal.  He is not spilling significant protein in his urine.    The following portions of the patient's history were reviewed and updated as appropriate: allergies, current medications, past family history, past medical history, past social history, past surgical history and problem list.    Review of Systems   Constitutional: Negative for chills and fever.   Respiratory: Negative for chest tightness and shortness of breath.    Cardiovascular: Negative for chest pain and leg swelling.   Neurological: Negative for dizziness and headache.       Objective   Physical Exam  Vitals and nursing note reviewed.   Constitutional:       Appearance: Normal appearance.   Cardiovascular:      Rate and Rhythm: Normal rate and regular rhythm.      Pulses: Normal pulses.   Pulmonary:      Effort: Pulmonary effort is normal.      Breath sounds: No rhonchi or rales.   Musculoskeletal:      Right lower leg: No edema.      Left lower leg: No edema.   Neurological:      Mental Status: He is alert.           Assessment & Plan   Diagnoses and all orders for this visit:    1. Essential (primary) hypertension (Primary)    2. Type 2 diabetes mellitus without complication, without long-term current use of insulin (HCC)    Albert is here today for follow-up.  His blood pressure is well controlled on his current dose and I do not think we need to  increase that.  We will see him back in 6 months

## 2023-05-18 RX ORDER — LOSARTAN POTASSIUM 50 MG/1
TABLET ORAL
Qty: 90 TABLET | Refills: 3 | OUTPATIENT
Start: 2023-05-18

## 2023-05-31 ENCOUNTER — OFFICE VISIT (OUTPATIENT)
Dept: FAMILY MEDICINE CLINIC | Facility: CLINIC | Age: 53
End: 2023-05-31

## 2023-05-31 VITALS
BODY MASS INDEX: 41.22 KG/M2 | RESPIRATION RATE: 16 BRPM | OXYGEN SATURATION: 96 % | DIASTOLIC BLOOD PRESSURE: 84 MMHG | WEIGHT: 272 LBS | HEART RATE: 74 BPM | HEIGHT: 68 IN | SYSTOLIC BLOOD PRESSURE: 126 MMHG

## 2023-05-31 DIAGNOSIS — Z12.11 SCREENING FOR MALIGNANT NEOPLASM OF COLON: ICD-10-CM

## 2023-05-31 DIAGNOSIS — I10 ESSENTIAL (PRIMARY) HYPERTENSION: Primary | ICD-10-CM

## 2023-05-31 PROCEDURE — 99213 OFFICE O/P EST LOW 20 MIN: CPT | Performed by: INTERNAL MEDICINE

## 2023-05-31 RX ORDER — GLIPIZIDE 10 MG/1
1 TABLET ORAL 2 TIMES DAILY
COMMUNITY
Start: 2023-05-08

## 2023-05-31 RX ORDER — LOSARTAN POTASSIUM 50 MG/1
50 TABLET ORAL DAILY
Qty: 90 TABLET | Refills: 3 | Status: SHIPPED | OUTPATIENT
Start: 2023-05-31

## 2023-05-31 NOTE — PROGRESS NOTES
Answers for HPI/ROS submitted by the patient on 5/24/2023  What is the primary reason for your visit?: High Blood Pressure  Chronicity: recurrent  Onset: more than 1 month ago  Progression since onset: unchanged  Condition status: controlled  anxiety: No  blurred vision: No  chest pain: No  headaches: No  malaise/fatigue: No  neck pain: No  orthopnea: No  palpitations: No  peripheral edema: No  PND: No  shortness of breath: No  sweats: No  Agents associated with hypertension: no associated agents  CAD risks: diabetes mellitus  Compliance problems: no compliance problems    Subjective Chief complaint is follow-up on blood pressure  Victor Hugo Monet is a 53 y.o. male.     History of Present Illness Victor Hugo is here today for follow-up on his blood pressure.  Is well controlled on his current dose of 50 mg of losartan.  His diabetes however has not been doing so well.  He had his glipizide dose increased recently.  His weight has remained stable.  He is a  and does travel.  It makes it hard to eat healthy foods.    The following portions of the patient's history were reviewed and updated as appropriate: allergies, current medications, past family history, past medical history, past social history, past surgical history and problem list.    Review of Systems   Eyes: Negative for blurred vision.   Respiratory: Negative for shortness of breath.    Cardiovascular: Negative for chest pain and palpitations.   Musculoskeletal: Negative for neck pain.   Neurological: Negative for dizziness, light-headedness and headache.       Objective   Physical Exam  Vitals and nursing note reviewed.   Constitutional:       Appearance: Normal appearance.   Cardiovascular:      Rate and Rhythm: Normal rate and regular rhythm.      Pulses: Normal pulses.      Heart sounds: No murmur heard.    No friction rub. No gallop.   Pulmonary:      Effort: Pulmonary effort is normal.      Breath sounds: No wheezing, rhonchi or rales.    Musculoskeletal:      Right lower leg: No edema.      Left lower leg: No edema.   Neurological:      Mental Status: He is alert.           Assessment & Plan   Diagnoses and all orders for this visit:    1. Essential (primary) hypertension (Primary)    2. Screening for malignant neoplasm of colon  -     Ambulatory Referral to Colorectal Surgery    Other orders  -     losartan (COZAAR) 50 MG tablet; Take 1 tablet by mouth Daily.  Dispense: 90 tablet; Refill: 3    Unfortunately with the [facility] system down I cannot access his most recent diabetic labs.  He does report that his A1c was about 7.8.  He is overdue for his colorectal screening and we will get him set up for that.  We did advise a physical exam sometime in August

## 2023-08-03 ENCOUNTER — PREP FOR SURGERY (OUTPATIENT)
Dept: OTHER | Facility: HOSPITAL | Age: 53
End: 2023-08-03
Payer: COMMERCIAL

## 2023-08-03 DIAGNOSIS — Z86.010 PERSONAL HISTORY OF COLONIC POLYPS: Primary | ICD-10-CM

## 2023-08-03 DIAGNOSIS — Z83.71 FAMILY HISTORY OF COLONIC POLYPS: ICD-10-CM

## 2023-11-01 PROBLEM — Z83.719 FAMILY HISTORY OF COLONIC POLYPS: Status: ACTIVE | Noted: 2023-08-03

## 2023-11-14 ENCOUNTER — TELEPHONE (OUTPATIENT)
Dept: SURGERY | Facility: CLINIC | Age: 53
End: 2023-11-14
Payer: COMMERCIAL

## 2024-04-17 ENCOUNTER — HOSPITAL ENCOUNTER (OUTPATIENT)
Facility: HOSPITAL | Age: 54
Setting detail: HOSPITAL OUTPATIENT SURGERY
Discharge: HOME | End: 2024-04-17
Attending: COLON & RECTAL SURGERY | Admitting: COLON & RECTAL SURGERY
Payer: COMMERCIAL

## 2024-04-17 ENCOUNTER — ANESTHESIA (OUTPATIENT)
Dept: GASTROENTEROLOGY | Facility: HOSPITAL | Age: 54
End: 2024-04-17
Payer: COMMERCIAL

## 2024-04-17 ENCOUNTER — ANESTHESIA EVENT (OUTPATIENT)
Dept: GASTROENTEROLOGY | Facility: HOSPITAL | Age: 54
End: 2024-04-17
Payer: COMMERCIAL

## 2024-04-17 VITALS
HEIGHT: 68 IN | BODY MASS INDEX: 40.47 KG/M2 | DIASTOLIC BLOOD PRESSURE: 85 MMHG | HEART RATE: 58 BPM | SYSTOLIC BLOOD PRESSURE: 130 MMHG | OXYGEN SATURATION: 96 % | RESPIRATION RATE: 16 BRPM | WEIGHT: 267 LBS

## 2024-04-17 DIAGNOSIS — Z83.719 FAMILY HISTORY OF COLONIC POLYPS: ICD-10-CM

## 2024-04-17 DIAGNOSIS — Z86.010 PERSONAL HISTORY OF COLONIC POLYPS: ICD-10-CM

## 2024-04-17 LAB — GLUCOSE BLDC GLUCOMTR-MCNC: 190 MG/DL (ref 70–130)

## 2024-04-17 PROCEDURE — 45380 COLONOSCOPY AND BIOPSY: CPT | Performed by: COLON & RECTAL SURGERY

## 2024-04-17 PROCEDURE — 25810000003 LACTATED RINGERS PER 1000 ML: Performed by: COLON & RECTAL SURGERY

## 2024-04-17 PROCEDURE — 25010000002 PROPOFOL 1000 MG/100ML EMULSION: Performed by: NURSE ANESTHETIST, CERTIFIED REGISTERED

## 2024-04-17 PROCEDURE — 82948 REAGENT STRIP/BLOOD GLUCOSE: CPT

## 2024-04-17 PROCEDURE — 25810000003 LACTATED RINGERS PER 1000 ML: Performed by: NURSE ANESTHETIST, CERTIFIED REGISTERED

## 2024-04-17 PROCEDURE — 88305 TISSUE EXAM BY PATHOLOGIST: CPT | Performed by: COLON & RECTAL SURGERY

## 2024-04-17 RX ORDER — DIPHENHYDRAMINE HYDROCHLORIDE 50 MG/ML
12.5 INJECTION INTRAMUSCULAR; INTRAVENOUS
Status: DISCONTINUED | OUTPATIENT
Start: 2024-04-17 | End: 2024-04-17 | Stop reason: HOSPADM

## 2024-04-17 RX ORDER — SODIUM CHLORIDE, SODIUM LACTATE, POTASSIUM CHLORIDE, CALCIUM CHLORIDE 600; 310; 30; 20 MG/100ML; MG/100ML; MG/100ML; MG/100ML
30 INJECTION, SOLUTION INTRAVENOUS CONTINUOUS
Status: DISCONTINUED | OUTPATIENT
Start: 2024-04-17 | End: 2024-04-17 | Stop reason: HOSPADM

## 2024-04-17 RX ORDER — SODIUM CHLORIDE, SODIUM LACTATE, POTASSIUM CHLORIDE, CALCIUM CHLORIDE 600; 310; 30; 20 MG/100ML; MG/100ML; MG/100ML; MG/100ML
INJECTION, SOLUTION INTRAVENOUS CONTINUOUS PRN
Status: DISCONTINUED | OUTPATIENT
Start: 2024-04-17 | End: 2024-04-17 | Stop reason: SURG

## 2024-04-17 RX ORDER — PROMETHAZINE HYDROCHLORIDE 25 MG/1
25 TABLET ORAL ONCE AS NEEDED
Status: DISCONTINUED | OUTPATIENT
Start: 2024-04-17 | End: 2024-04-17 | Stop reason: HOSPADM

## 2024-04-17 RX ORDER — PROMETHAZINE HYDROCHLORIDE 25 MG/1
25 SUPPOSITORY RECTAL ONCE AS NEEDED
Status: DISCONTINUED | OUTPATIENT
Start: 2024-04-17 | End: 2024-04-17 | Stop reason: HOSPADM

## 2024-04-17 RX ORDER — DROPERIDOL 2.5 MG/ML
0.62 INJECTION, SOLUTION INTRAMUSCULAR; INTRAVENOUS
Status: DISCONTINUED | OUTPATIENT
Start: 2024-04-17 | End: 2024-04-17 | Stop reason: HOSPADM

## 2024-04-17 RX ORDER — ONDANSETRON 2 MG/ML
4 INJECTION INTRAMUSCULAR; INTRAVENOUS ONCE AS NEEDED
Status: DISCONTINUED | OUTPATIENT
Start: 2024-04-17 | End: 2024-04-17 | Stop reason: HOSPADM

## 2024-04-17 RX ORDER — PROPOFOL 10 MG/ML
INJECTION, EMULSION INTRAVENOUS CONTINUOUS PRN
Status: DISCONTINUED | OUTPATIENT
Start: 2024-04-17 | End: 2024-04-17 | Stop reason: SURG

## 2024-04-17 RX ORDER — LIDOCAINE HYDROCHLORIDE 20 MG/ML
INJECTION, SOLUTION INFILTRATION; PERINEURAL AS NEEDED
Status: DISCONTINUED | OUTPATIENT
Start: 2024-04-17 | End: 2024-04-17 | Stop reason: SURG

## 2024-04-17 RX ADMIN — PROPOFOL INJECTABLE EMULSION 70 MG: 10 INJECTION, EMULSION INTRAVENOUS at 11:01

## 2024-04-17 RX ADMIN — LIDOCAINE HYDROCHLORIDE 60 MG: 20 INJECTION, SOLUTION INFILTRATION; PERINEURAL at 11:02

## 2024-04-17 RX ADMIN — SODIUM CHLORIDE, POTASSIUM CHLORIDE, SODIUM LACTATE AND CALCIUM CHLORIDE: 600; 310; 30; 20 INJECTION, SOLUTION INTRAVENOUS at 10:58

## 2024-04-17 RX ADMIN — SODIUM CHLORIDE, POTASSIUM CHLORIDE, SODIUM LACTATE AND CALCIUM CHLORIDE 30 ML/HR: 600; 310; 30; 20 INJECTION, SOLUTION INTRAVENOUS at 10:27

## 2024-04-17 RX ADMIN — PROPOFOL INJECTABLE EMULSION 30 MG: 10 INJECTION, EMULSION INTRAVENOUS at 11:03

## 2024-04-17 RX ADMIN — PROPOFOL INJECTABLE EMULSION 200 MCG/KG/MIN: 10 INJECTION, EMULSION INTRAVENOUS at 10:59

## 2024-04-17 NOTE — ANESTHESIA PREPROCEDURE EVALUATION
Anesthesia Evaluation     Patient summary reviewed and Nursing notes reviewed   NPO Solid Status: > 8 hours             Airway   Mallampati: I  TM distance: <3 FB  Neck ROM: full  no difficulty expected  Dental - normal exam     Pulmonary - normal exam   (+) ,sleep apnea  Cardiovascular - normal exam    (+) hyperlipidemia      Neuro/Psych- negative ROS  GI/Hepatic/Renal/Endo    (+) obesity, hiatal hernia, GERD, liver disease, diabetes mellitus    Musculoskeletal     (+) back pain  Abdominal  - normal exam    Bowel sounds: normal.   Substance History - negative use     OB/GYN negative ob/gyn ROS         Other                          Anesthesia Plan    ASA 3     MAC       Anesthetic plan, risks, benefits, and alternatives have been provided, discussed and informed consent has been obtained with: patient.

## 2024-04-17 NOTE — ANESTHESIA POSTPROCEDURE EVALUATION
"Patient: Victor Hugo Monet    Procedure Summary       Date: 04/17/24 Room / Location:  [facility]    Anesthesia Start: 1058 Anesthesia Stop: 1119    Procedure: COLONOSCOPY to cecum with cold forcep polypectomies Diagnosis:       Personal history of colonic polyps      Family history of colonic polyps      (Personal history of colonic polyps [Z86.010])      (Family history of colonic polyps [Z83.71])    Surgeons: Aye Drummond MD Provider: Christopher Martin MD    Anesthesia Type: MAC ASA Status: 3            Anesthesia Type: MAC    Vitals  Vitals Value Taken Time   /85 04/17/24 1142   Temp     Pulse 56 04/17/24 1151   Resp 16 04/17/24 1142   SpO2 96 % 04/17/24 1151   Vitals shown include unfiled device data.        Post Anesthesia Care and Evaluation    Patient location during evaluation: bedside  Patient participation: complete - patient participated  Level of consciousness: awake and alert  Pain management: adequate    Airway patency: patent  Anesthetic complications: No anesthetic complications    Cardiovascular status: acceptable  Respiratory status: acceptable  Hydration status: acceptable    Comments: /85 (BP Location: Left arm, Patient Position: Sitting)   Pulse 58   Resp 16   Ht 172.7 cm (68\")   Wt 121 kg (267 lb)   SpO2 96%   BMI 40.60 kg/m²     "

## 2024-04-17 NOTE — H&P
Victor Hugo Monet is a 54 y.o. male  who is referred by Aye Drummond MD for a colonoscopy. He   has an indications: previous adenomatous polyp.     He denies any change in bowel function, melena, or hematochezia.    Past Medical History:   Diagnosis Date    Exomphalos     GERD (gastroesophageal reflux disease)     Hiatal hernia     History of gastritis     Hyperlipidemia     Internal hemorrhoids     NAFLD (nonalcoholic fatty liver disease)     Obesity     Pancreatitis 02/20/2016    RECURRENT, SEEN AT [facility]    Rectal bleeding     Sleep apnea     ON CPAP.  DR. AMANDA Lamb of salivary duct     Strain of lumbar paraspinal muscle 04/04/2002    SEEN AT [facility] ER    Type 2 diabetes mellitus        Past Surgical History:   Procedure Laterality Date    ADENOIDECTOMY      APPENDECTOMY N/A 1980    COLONOSCOPY N/A 2/15/2018    Procedure: COLONOSCOPY to cecum with hot snare polypectomies and hemorrhoid banding;  Surgeon: Aye Drummond MD;  Location: [facility] ENDOSCOPY;  Service:     ENDOSCOPY AND COLONOSCOPY N/A 12/23/2009    ACUTE GASTRITIS, OTHERWISE EGD WNL, INTERNAL HEMORRHOIDS, OTHERWISE CY WNL, DR. NICKI CAVANAUGH AT [facility]    ERCP      HEMORRHOIDECTOMY N/A 2/15/2018    Procedure: HEMORRHOID BANDING;  Surgeon: Aye Drummond MD;  Location: [facility] ENDOSCOPY;  Service:     HERNIA REPAIR  2005    Umbilical and right inguinal,DR. EASON    SUBMANDIBULAR DUCT STONE REMOVAL Left 06/15/2017    DR. EDGAR CASILLAS       Medications Prior to Admission   Medication Sig Dispense Refill Last Dose    atorvastatin (LIPITOR) 10 MG tablet Take 1 tablet by mouth Daily. 90 tablet 0     glipizide (GLUCOTROL) 10 MG tablet Take 1 tablet by mouth 2 (Two) Times a Day.   4/16/2024    glucose blood (FREESTYLE LITE) test strip 1 each by Other route Daily. Use as instructed 100 each 1     JANUVIA 100 MG tablet TK 1 T PO  QD  1 4/16/2024    metFORMIN (FORTAMET) 1000 MG (OSM) 24 hr tablet    4/16/2024    pioglitazone (ACTOS) 15 MG tablet    4/16/2024     Probiotic Product (ALIGN PO) Take 1 tablet by mouth.       esomeprazole (nexIUM) 40 MG capsule Take 1 capsule by mouth Daily. 90 capsule 3     losartan (COZAAR) 50 MG tablet Take 1 tablet by mouth Daily. 90 tablet 3 4/15/2024       No Known Allergies    Family History   Problem Relation Age of Onset    Cancer Mother         RENAL KIDNEY CA     Diabetes Mother     Hypertension Mother     Heart disease Mother     Emphysema Mother     Kidney cancer Mother     Lung cancer Father     Diabetes Father     Hypertension Father     No Known Problems Son     Heart disease Maternal Grandfather     Diabetes Paternal Grandmother     Heart disease Paternal Grandfather     Malig Hyperthermia Neg Hx        Social History     Socioeconomic History    Marital status:    Tobacco Use    Smoking status: Former     Current packs/day: 0.00     Average packs/day: 1 pack/day for 10.0 years (10.0 ttl pk-yrs)     Types: Cigarettes     Start date:      Quit date:      Years since quittin.3    Smokeless tobacco: Never   Substance and Sexual Activity    Alcohol use: Yes     Alcohol/week: 2.0 standard drinks of alcohol     Types: 2 Cans of beer per week     Comment: SOCIALLY     Drug use: No    Sexual activity: Defer       Review of Systems   Gastrointestinal:  Negative for abdominal pain, nausea and vomiting.   All other systems reviewed and are negative.      Vitals:    24 1020   BP: 131/81   Pulse: 64   Resp: 20   SpO2: 97%         Physical Exam  Constitutional:       General: He is not in acute distress.     Appearance: He is well-developed.   HENT:      Head: Normocephalic and atraumatic.   Abdominal:      General: There is no distension.      Palpations: Abdomen is soft.   Musculoskeletal:         General: Normal range of motion.   Neurological:      Mental Status: He is alert.   Psychiatric:         Thought Content: Thought content normal.           Assessment & Plan      indications: previous adenomatous polyp          I recommend colonoscopy.  I described risks, benefits of the procedure with the patient including but not limited to bleeding, infection, possibility of perforation and possible polypectomy. All of the patient's questions were answered and they would like to proceed with the above recommendations.

## 2024-04-18 LAB
LAB AP CASE REPORT: NORMAL
PATH REPORT.FINAL DX SPEC: NORMAL
PATH REPORT.GROSS SPEC: NORMAL

## 2024-05-10 RX ORDER — LOSARTAN POTASSIUM 50 MG/1
50 TABLET ORAL DAILY
Qty: 90 TABLET | Refills: 3 | OUTPATIENT
Start: 2024-05-10

## 2024-06-12 RX ORDER — LOSARTAN POTASSIUM 50 MG/1
50 TABLET ORAL DAILY
Qty: 90 TABLET | Refills: 3 | Status: SHIPPED | OUTPATIENT
Start: 2024-06-12

## 2024-06-12 RX ORDER — BLOOD-GLUCOSE METER
1 KIT MISCELLANEOUS DAILY
Qty: 100 EACH | Refills: 1 | Status: SHIPPED | OUTPATIENT
Start: 2024-06-12

## 2024-06-20 ENCOUNTER — OFFICE VISIT (OUTPATIENT)
Dept: FAMILY MEDICINE CLINIC | Facility: CLINIC | Age: 54
End: 2024-06-20
Payer: COMMERCIAL

## 2024-06-20 VITALS
WEIGHT: 264 LBS | OXYGEN SATURATION: 96 % | HEIGHT: 68 IN | DIASTOLIC BLOOD PRESSURE: 82 MMHG | SYSTOLIC BLOOD PRESSURE: 118 MMHG | RESPIRATION RATE: 18 BRPM | BODY MASS INDEX: 40.01 KG/M2 | HEART RATE: 70 BPM

## 2024-06-20 DIAGNOSIS — I10 ESSENTIAL (PRIMARY) HYPERTENSION: ICD-10-CM

## 2024-06-20 DIAGNOSIS — G47.30 SLEEP APNEA, UNSPECIFIED TYPE: ICD-10-CM

## 2024-06-20 DIAGNOSIS — E11.9 TYPE 2 DIABETES MELLITUS WITHOUT COMPLICATION, WITHOUT LONG-TERM CURRENT USE OF INSULIN: Primary | ICD-10-CM

## 2024-06-20 DIAGNOSIS — E78.5 HYPERLIPIDEMIA, UNSPECIFIED HYPERLIPIDEMIA TYPE: ICD-10-CM

## 2024-06-20 PROCEDURE — 99214 OFFICE O/P EST MOD 30 MIN: CPT | Performed by: INTERNAL MEDICINE

## 2024-06-21 LAB
ALBUMIN SERPL-MCNC: 4.4 G/DL (ref 3.5–5.2)
ALBUMIN/GLOB SERPL: 1.8 G/DL
ALP SERPL-CCNC: 54 U/L (ref 39–117)
ALT SERPL-CCNC: 35 U/L (ref 1–41)
AST SERPL-CCNC: 27 U/L (ref 1–40)
BASOPHILS # BLD AUTO: 0.05 10*3/MM3 (ref 0–0.2)
BASOPHILS NFR BLD AUTO: 0.8 % (ref 0–1.5)
BILIRUB SERPL-MCNC: 0.4 MG/DL (ref 0–1.2)
BUN SERPL-MCNC: 12 MG/DL (ref 6–20)
BUN/CREAT SERPL: 13.6 (ref 7–25)
CALCIUM SERPL-MCNC: 9.7 MG/DL (ref 8.6–10.5)
CHLORIDE SERPL-SCNC: 101 MMOL/L (ref 98–107)
CO2 SERPL-SCNC: 26.7 MMOL/L (ref 22–29)
CREAT SERPL-MCNC: 0.88 MG/DL (ref 0.76–1.27)
EGFRCR SERPLBLD CKD-EPI 2021: 101.6 ML/MIN/1.73
EOSINOPHIL # BLD AUTO: 0.09 10*3/MM3 (ref 0–0.4)
EOSINOPHIL NFR BLD AUTO: 1.4 % (ref 0.3–6.2)
ERYTHROCYTE [DISTWIDTH] IN BLOOD BY AUTOMATED COUNT: 12.6 % (ref 12.3–15.4)
GLOBULIN SER CALC-MCNC: 2.4 GM/DL
GLUCOSE SERPL-MCNC: 174 MG/DL (ref 65–99)
HBA1C MFR BLD: 8.2 % (ref 4.8–5.6)
HCT VFR BLD AUTO: 47.8 % (ref 37.5–51)
HGB BLD-MCNC: 15.5 G/DL (ref 13–17.7)
IMM GRANULOCYTES # BLD AUTO: 0.03 10*3/MM3 (ref 0–0.05)
IMM GRANULOCYTES NFR BLD AUTO: 0.5 % (ref 0–0.5)
LYMPHOCYTES # BLD AUTO: 1.29 10*3/MM3 (ref 0.7–3.1)
LYMPHOCYTES NFR BLD AUTO: 19.5 % (ref 19.6–45.3)
MCH RBC QN AUTO: 29.9 PG (ref 26.6–33)
MCHC RBC AUTO-ENTMCNC: 32.4 G/DL (ref 31.5–35.7)
MCV RBC AUTO: 92.1 FL (ref 79–97)
MONOCYTES # BLD AUTO: 0.48 10*3/MM3 (ref 0.1–0.9)
MONOCYTES NFR BLD AUTO: 7.3 % (ref 5–12)
NEUTROPHILS # BLD AUTO: 4.68 10*3/MM3 (ref 1.7–7)
NEUTROPHILS NFR BLD AUTO: 70.5 % (ref 42.7–76)
NRBC BLD AUTO-RTO: 0 /100 WBC (ref 0–0.2)
PLATELET # BLD AUTO: 157 10*3/MM3 (ref 140–450)
POTASSIUM SERPL-SCNC: 4.8 MMOL/L (ref 3.5–5.2)
PROT SERPL-MCNC: 6.8 G/DL (ref 6–8.5)
RBC # BLD AUTO: 5.19 10*6/MM3 (ref 4.14–5.8)
SODIUM SERPL-SCNC: 139 MMOL/L (ref 136–145)
WBC # BLD AUTO: 6.62 10*3/MM3 (ref 3.4–10.8)

## 2024-08-16 ENCOUNTER — TELEPHONE (OUTPATIENT)
Dept: FAMILY MEDICINE CLINIC | Facility: CLINIC | Age: 54
End: 2024-08-16
Payer: COMMERCIAL

## 2024-08-16 NOTE — TELEPHONE ENCOUNTER
Patient called to provide the fax number for his form he sent over via [employer]. He would like it faxed to Dr Lázaro Neff at [phone] once complete. Patient is asking that the form be complete asa so he can return to work. Thanks

## 2025-05-20 RX ORDER — LOSARTAN POTASSIUM 50 MG/1
50 TABLET ORAL DAILY
Qty: 90 TABLET | Refills: 3 | Status: SHIPPED | OUTPATIENT
Start: 2025-05-20

## 2025-05-27 ENCOUNTER — TELEPHONE (OUTPATIENT)
Dept: FAMILY MEDICINE CLINIC | Facility: CLINIC | Age: 55
End: 2025-05-27
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
## 2025-05-28 DIAGNOSIS — S52.616S: Primary | ICD-10-CM

## 2025-08-18 ENCOUNTER — OFFICE VISIT (OUTPATIENT)
Dept: FAMILY MEDICINE CLINIC | Facility: CLINIC | Age: 55
End: 2025-08-18
Payer: COMMERCIAL

## 2025-08-18 VITALS
HEIGHT: 68 IN | SYSTOLIC BLOOD PRESSURE: 122 MMHG | WEIGHT: 263 LBS | OXYGEN SATURATION: 97 % | DIASTOLIC BLOOD PRESSURE: 84 MMHG | RESPIRATION RATE: 18 BRPM | HEART RATE: 67 BPM | BODY MASS INDEX: 39.86 KG/M2

## 2025-08-18 DIAGNOSIS — Z00.00 ANNUAL PHYSICAL EXAM: Primary | ICD-10-CM

## 2025-08-18 DIAGNOSIS — K76.0 FATTY LIVER: ICD-10-CM

## 2025-08-18 DIAGNOSIS — E11.9 TYPE 2 DIABETES MELLITUS WITHOUT COMPLICATION, WITHOUT LONG-TERM CURRENT USE OF INSULIN: ICD-10-CM

## 2025-08-18 DIAGNOSIS — Z12.5 SCREENING PSA (PROSTATE SPECIFIC ANTIGEN): ICD-10-CM

## 2025-08-18 DIAGNOSIS — Z51.81 MEDICATION MONITORING ENCOUNTER: ICD-10-CM

## 2025-08-18 DIAGNOSIS — I10 ESSENTIAL (PRIMARY) HYPERTENSION: ICD-10-CM

## 2025-08-18 PROBLEM — H52.229 REGULAR ASTIGMATISM: Status: RESOLVED | Noted: 2025-03-07 | Resolved: 2025-08-18

## 2025-08-18 PROBLEM — S52.609A CLOSED FRACTURE OF DISTAL END OF ULNA: Status: RESOLVED | Noted: 2025-05-29 | Resolved: 2025-08-18

## 2025-08-18 PROCEDURE — 99396 PREV VISIT EST AGE 40-64: CPT | Performed by: INTERNAL MEDICINE

## 2025-08-19 LAB
25(OH)D3+25(OH)D2 SERPL-MCNC: 29.6 NG/ML (ref 30–100)
ALBUMIN SERPL-MCNC: 4.4 G/DL (ref 3.8–4.9)
ALBUMIN/CREAT UR: 13 MG/G CREAT (ref 0–29)
ALP SERPL-CCNC: 64 IU/L (ref 44–121)
ALT SERPL-CCNC: 39 IU/L (ref 0–44)
AST SERPL-CCNC: 30 IU/L (ref 0–40)
BASOPHILS # BLD AUTO: 0.1 X10E3/UL (ref 0–0.2)
BASOPHILS NFR BLD AUTO: 1 %
BILIRUB SERPL-MCNC: 0.5 MG/DL (ref 0–1.2)
BUN SERPL-MCNC: 15 MG/DL (ref 6–24)
BUN/CREAT SERPL: 18 (ref 9–20)
CALCIUM SERPL-MCNC: 9.6 MG/DL (ref 8.7–10.2)
CHLORIDE SERPL-SCNC: 102 MMOL/L (ref 96–106)
CHOLEST SERPL-MCNC: 136 MG/DL (ref 100–199)
CO2 SERPL-SCNC: 22 MMOL/L (ref 20–29)
CREAT SERPL-MCNC: 0.84 MG/DL (ref 0.76–1.27)
CREAT UR-MCNC: 136.8 MG/DL
EGFRCR SERPLBLD CKD-EPI 2021: 102 ML/MIN/1.73
EOSINOPHIL # BLD AUTO: 0.1 X10E3/UL (ref 0–0.4)
EOSINOPHIL NFR BLD AUTO: 1 %
ERYTHROCYTE [DISTWIDTH] IN BLOOD BY AUTOMATED COUNT: 13.2 % (ref 11.6–15.4)
GLOBULIN SER CALC-MCNC: 2.7 G/DL (ref 1.5–4.5)
GLUCOSE SERPL-MCNC: 196 MG/DL (ref 70–99)
HBA1C MFR BLD: 8.1 % (ref 4.8–5.6)
HCT VFR BLD AUTO: 49.2 % (ref 37.5–51)
HDLC SERPL-MCNC: 51 MG/DL
HGB BLD-MCNC: 15.4 G/DL (ref 13–17.7)
IMM GRANULOCYTES # BLD AUTO: 0 X10E3/UL (ref 0–0.1)
IMM GRANULOCYTES NFR BLD AUTO: 1 %
LDLC SERPL CALC-MCNC: 61 MG/DL (ref 0–99)
LYMPHOCYTES # BLD AUTO: 1.5 X10E3/UL (ref 0.7–3.1)
LYMPHOCYTES NFR BLD AUTO: 29 %
MCH RBC QN AUTO: 30 PG (ref 26.6–33)
MCHC RBC AUTO-ENTMCNC: 31.3 G/DL (ref 31.5–35.7)
MCV RBC AUTO: 96 FL (ref 79–97)
MICROALBUMIN UR-MCNC: 18.1 UG/ML
MONOCYTES # BLD AUTO: 0.4 X10E3/UL (ref 0.1–0.9)
MONOCYTES NFR BLD AUTO: 9 %
NEUTROPHILS # BLD AUTO: 3 X10E3/UL (ref 1.4–7)
NEUTROPHILS NFR BLD AUTO: 59 %
PLATELET # BLD AUTO: 154 X10E3/UL (ref 150–450)
POTASSIUM SERPL-SCNC: 4.4 MMOL/L (ref 3.5–5.2)
PROT SERPL-MCNC: 7.1 G/DL (ref 6–8.5)
PSA SERPL-MCNC: 0.4 NG/ML (ref 0–4)
RBC # BLD AUTO: 5.13 X10E6/UL (ref 4.14–5.8)
SODIUM SERPL-SCNC: 141 MMOL/L (ref 134–144)
T4 FREE SERPL-MCNC: 1.32 NG/DL (ref 0.82–1.77)
TRIGL SERPL-MCNC: 140 MG/DL (ref 0–149)
TSH SERPL DL<=0.005 MIU/L-ACNC: 2.06 UIU/ML (ref 0.45–4.5)
VIT B12 SERPL-MCNC: 420 PG/ML (ref 232–1245)
VLDLC SERPL CALC-MCNC: 24 MG/DL (ref 5–40)
WBC # BLD AUTO: 5.1 X10E3/UL (ref 3.4–10.8)

## 2025-08-20 LAB
A2 MACROGLOB SERPL-MCNC: 377 MG/DL (ref 110–276)
ALT SERPL W P-5'-P-CCNC: 41 IU/L (ref 0–55)
APO A-I SERPL-MCNC: 150 MG/DL (ref 101–178)
AST SERPL W P-5'-P-CCNC: 32 IU/L (ref 0–40)
BILIRUB SERPL-MCNC: 0.3 MG/DL (ref 0–1.2)
CHOLEST SERPL-MCNC: 142 MG/DL (ref 100–199)
FIBROSIS SCORING:: ABNORMAL
FIBROSIS STAGE SERPL QL: ABNORMAL
GGT SERPL-CCNC: 42 IU/L (ref 0–65)
GLUCOSE SERPL-MCNC: 203 MG/DL (ref 70–99)
HAPTOGLOB SERPL-MCNC: 161 MG/DL (ref 29–370)
LABORATORY COMMENT REPORT: ABNORMAL
LIVER FIBR SCORE SERPL CALC.FIBROSURE: 0.44 (ref 0–0.21)
LIVER STEATOSIS GRADE SERPL QL: ABNORMAL
LIVER STEATOSIS SCORE SERPL: 0.62 (ref 0–0.4)
NASH GRADE SERPL QL: ABNORMAL
NASH INTERPRETATION SERPL-IMP: ABNORMAL
NASH SCORE SERPL: 0.68 (ref 0–0.25)
NASH SCORING: ABNORMAL
STEATOSIS SCORING: ABNORMAL
TEST PERFORMANCE INFO SPEC: ABNORMAL
TEST PERFORMANCE INFO SPEC: ABNORMAL
TRIGL SERPL-MCNC: 158 MG/DL (ref 0–149)

## (undated) DEVICE — KT ORCA ORCAPOD DISP STRL

## (undated) DEVICE — SENSR O2 OXIMAX FNGR A/ 18IN NONSTR

## (undated) DEVICE — CANN O2 ETCO2 FITS ALL CONN CO2 SMPL A/ 7IN DISP LF

## (undated) DEVICE — SNAR POLYP SENSATION STDOVL 27 240 BX40

## (undated) DEVICE — ADAPT CLN BIOGUARD AIR/H2O DISP

## (undated) DEVICE — THE TORRENT IRRIGATION SCOPE CONNECTOR IS USED WITH THE TORRENT IRRIGATION TUBING TO PROVIDE IRRIGATION FLUIDS SUCH AS STERILE WATER DURING GASTROINTESTINAL ENDOSCOPIC PROCEDURES WHEN USED IN CONJUNCTION WITH AN IRRIGATION PUMP (OR ELECTROSURGICAL UNIT).: Brand: TORRENT

## (undated) DEVICE — CANN NASL CO2 TRULINK W/O2 A/

## (undated) DEVICE — SINGLE-USE BIOPSY FORCEPS: Brand: RADIAL JAW 4

## (undated) DEVICE — LN SMPL CO2 SHTRM SD STREAM W/M LUER

## (undated) DEVICE — TUBING, SUCTION, 1/4" X 10', STRAIGHT: Brand: MEDLINE

## (undated) DEVICE — THE SINGLE USE ETRAP – POLYP TRAP IS USED FOR SUCTION RETRIEVAL OF ENDOSCOPICALLY REMOVED POLYPS.: Brand: ETRAP

## (undated) DEVICE — Device: Brand: DEFENDO AIR/WATER/SUCTION AND BIOPSY VALVE